# Patient Record
Sex: FEMALE | Race: WHITE | NOT HISPANIC OR LATINO | Employment: FULL TIME | URBAN - NONMETROPOLITAN AREA
[De-identification: names, ages, dates, MRNs, and addresses within clinical notes are randomized per-mention and may not be internally consistent; named-entity substitution may affect disease eponyms.]

---

## 2021-07-12 ENCOUNTER — OFFICE VISIT (OUTPATIENT)
Dept: OBSTETRICS AND GYNECOLOGY | Facility: CLINIC | Age: 31
End: 2021-07-12

## 2021-07-12 VITALS
WEIGHT: 138 LBS | SYSTOLIC BLOOD PRESSURE: 128 MMHG | BODY MASS INDEX: 27.09 KG/M2 | HEIGHT: 60 IN | DIASTOLIC BLOOD PRESSURE: 86 MMHG

## 2021-07-12 DIAGNOSIS — N93.8 DUB (DYSFUNCTIONAL UTERINE BLEEDING): ICD-10-CM

## 2021-07-12 DIAGNOSIS — F17.200 SMOKER: ICD-10-CM

## 2021-07-12 DIAGNOSIS — R10.2 PELVIC PAIN: Primary | ICD-10-CM

## 2021-07-12 DIAGNOSIS — N94.10 DYSPAREUNIA, FEMALE: ICD-10-CM

## 2021-07-12 PROCEDURE — 99203 OFFICE O/P NEW LOW 30 MIN: CPT | Performed by: OBSTETRICS & GYNECOLOGY

## 2021-07-12 RX ORDER — METAXALONE 800 MG/1
800 TABLET ORAL 3 TIMES DAILY
Qty: 30 TABLET | Refills: 3 | Status: SHIPPED | OUTPATIENT
Start: 2021-07-12

## 2021-07-12 RX ORDER — CELECOXIB 100 MG/1
100 CAPSULE ORAL DAILY
Qty: 30 CAPSULE | Refills: 2 | Status: SHIPPED | OUTPATIENT
Start: 2021-07-12 | End: 2022-07-12

## 2021-07-12 RX ORDER — BUSPIRONE HYDROCHLORIDE 15 MG/1
15 TABLET ORAL 3 TIMES DAILY
COMMUNITY

## 2021-07-12 RX ORDER — VENLAFAXINE HYDROCHLORIDE 150 MG/1
150 CAPSULE, EXTENDED RELEASE ORAL DAILY
COMMUNITY

## 2021-07-12 RX ORDER — NORGESTIMATE AND ETHINYL ESTRADIOL 7DAYSX3 LO
1 KIT ORAL DAILY
COMMUNITY

## 2023-11-01 ENCOUNTER — TELEPHONE (OUTPATIENT)
Dept: OBSTETRICS AND GYNECOLOGY | Facility: CLINIC | Age: 33
End: 2023-11-01
Payer: COMMERCIAL

## 2023-11-02 ENCOUNTER — OFFICE VISIT (OUTPATIENT)
Dept: OBSTETRICS AND GYNECOLOGY | Facility: CLINIC | Age: 33
End: 2023-11-02
Payer: COMMERCIAL

## 2023-11-02 VITALS
DIASTOLIC BLOOD PRESSURE: 82 MMHG | HEIGHT: 60 IN | SYSTOLIC BLOOD PRESSURE: 118 MMHG | WEIGHT: 152 LBS | BODY MASS INDEX: 29.84 KG/M2

## 2023-11-02 DIAGNOSIS — N91.2 AMENORRHEA: Primary | ICD-10-CM

## 2023-11-02 PROBLEM — K21.9 GERD (GASTROESOPHAGEAL REFLUX DISEASE): Status: ACTIVE | Noted: 2023-11-02

## 2023-11-09 ENCOUNTER — OFFICE VISIT (OUTPATIENT)
Dept: OBSTETRICS AND GYNECOLOGY | Facility: CLINIC | Age: 33
End: 2023-11-09
Payer: COMMERCIAL

## 2023-11-09 VITALS
DIASTOLIC BLOOD PRESSURE: 80 MMHG | HEIGHT: 60 IN | WEIGHT: 152 LBS | BODY MASS INDEX: 29.84 KG/M2 | SYSTOLIC BLOOD PRESSURE: 142 MMHG

## 2023-11-09 DIAGNOSIS — O03.9 SAB (SPONTANEOUS ABORTION): Primary | ICD-10-CM

## 2023-11-09 DIAGNOSIS — O02.1 MISSED ABORTION: ICD-10-CM

## 2023-11-10 ENCOUNTER — TELEPHONE (OUTPATIENT)
Dept: OBSTETRICS AND GYNECOLOGY | Facility: CLINIC | Age: 33
End: 2023-11-10
Payer: COMMERCIAL

## 2023-11-10 LAB — HCG INTACT+B SERPL-ACNC: NORMAL MIU/ML

## 2023-11-20 ENCOUNTER — PRE-ADMISSION TESTING (OUTPATIENT)
Dept: PREADMISSION TESTING | Facility: HOSPITAL | Age: 33
End: 2023-11-20
Payer: COMMERCIAL

## 2023-11-20 ENCOUNTER — OFFICE VISIT (OUTPATIENT)
Dept: OBSTETRICS AND GYNECOLOGY | Facility: CLINIC | Age: 33
End: 2023-11-20
Payer: COMMERCIAL

## 2023-11-20 VITALS
BODY MASS INDEX: 28.11 KG/M2 | HEIGHT: 62 IN | DIASTOLIC BLOOD PRESSURE: 69 MMHG | HEART RATE: 74 BPM | SYSTOLIC BLOOD PRESSURE: 130 MMHG | RESPIRATION RATE: 16 BRPM | WEIGHT: 152.78 LBS | OXYGEN SATURATION: 98 %

## 2023-11-20 VITALS
BODY MASS INDEX: 29.84 KG/M2 | DIASTOLIC BLOOD PRESSURE: 80 MMHG | HEIGHT: 60 IN | WEIGHT: 152 LBS | SYSTOLIC BLOOD PRESSURE: 122 MMHG

## 2023-11-20 DIAGNOSIS — O02.1 MISSED ABORTION: Primary | ICD-10-CM

## 2023-11-20 DIAGNOSIS — O02.1 MISSED ABORTION: ICD-10-CM

## 2023-11-20 LAB
ABO GROUP BLD: NORMAL
ANION GAP SERPL CALCULATED.3IONS-SCNC: 9 MMOL/L (ref 5–15)
BASOPHILS # BLD AUTO: 0.05 10*3/MM3 (ref 0–0.2)
BASOPHILS NFR BLD AUTO: 0.6 % (ref 0–1.5)
BLD GP AB SCN SERPL QL: NEGATIVE
BUN SERPL-MCNC: 6 MG/DL (ref 6–20)
BUN/CREAT SERPL: 13.3 (ref 7–25)
CALCIUM SPEC-SCNC: 8.6 MG/DL (ref 8.6–10.5)
CHLORIDE SERPL-SCNC: 106 MMOL/L (ref 98–107)
CO2 SERPL-SCNC: 25 MMOL/L (ref 22–29)
CREAT SERPL-MCNC: 0.45 MG/DL (ref 0.57–1)
DEPRECATED RDW RBC AUTO: 43.1 FL (ref 37–54)
EGFRCR SERPLBLD CKD-EPI 2021: 130.5 ML/MIN/1.73
EOSINOPHIL # BLD AUTO: 0.18 10*3/MM3 (ref 0–0.4)
EOSINOPHIL NFR BLD AUTO: 2.3 % (ref 0.3–6.2)
ERYTHROCYTE [DISTWIDTH] IN BLOOD BY AUTOMATED COUNT: 12.5 % (ref 12.3–15.4)
GLUCOSE SERPL-MCNC: 85 MG/DL (ref 65–99)
HCT VFR BLD AUTO: 41.4 % (ref 34–46.6)
HGB BLD-MCNC: 14 G/DL (ref 12–15.9)
IMM GRANULOCYTES # BLD AUTO: 0.03 10*3/MM3 (ref 0–0.05)
IMM GRANULOCYTES NFR BLD AUTO: 0.4 % (ref 0–0.5)
LYMPHOCYTES # BLD AUTO: 3.11 10*3/MM3 (ref 0.7–3.1)
LYMPHOCYTES NFR BLD AUTO: 38.9 % (ref 19.6–45.3)
MCH RBC QN AUTO: 31.9 PG (ref 26.6–33)
MCHC RBC AUTO-ENTMCNC: 33.8 G/DL (ref 31.5–35.7)
MCV RBC AUTO: 94.3 FL (ref 79–97)
MONOCYTES # BLD AUTO: 0.58 10*3/MM3 (ref 0.1–0.9)
MONOCYTES NFR BLD AUTO: 7.3 % (ref 5–12)
NEUTROPHILS NFR BLD AUTO: 4.05 10*3/MM3 (ref 1.7–7)
NEUTROPHILS NFR BLD AUTO: 50.5 % (ref 42.7–76)
NRBC BLD AUTO-RTO: 0 /100 WBC (ref 0–0.2)
PLATELET # BLD AUTO: 283 10*3/MM3 (ref 140–450)
PMV BLD AUTO: 9.9 FL (ref 6–12)
POTASSIUM SERPL-SCNC: 3.9 MMOL/L (ref 3.5–5.2)
RBC # BLD AUTO: 4.39 10*6/MM3 (ref 3.77–5.28)
RH BLD: NEGATIVE
SODIUM SERPL-SCNC: 140 MMOL/L (ref 136–145)
T&S EXPIRATION DATE: NORMAL
WBC NRBC COR # BLD AUTO: 8 10*3/MM3 (ref 3.4–10.8)

## 2023-11-20 PROCEDURE — 36415 COLL VENOUS BLD VENIPUNCTURE: CPT

## 2023-11-20 PROCEDURE — 86850 RBC ANTIBODY SCREEN: CPT | Performed by: OBSTETRICS & GYNECOLOGY

## 2023-11-20 PROCEDURE — 86900 BLOOD TYPING SEROLOGIC ABO: CPT | Performed by: OBSTETRICS & GYNECOLOGY

## 2023-11-20 PROCEDURE — 80048 BASIC METABOLIC PNL TOTAL CA: CPT

## 2023-11-20 PROCEDURE — 86901 BLOOD TYPING SEROLOGIC RH(D): CPT | Performed by: OBSTETRICS & GYNECOLOGY

## 2023-11-20 PROCEDURE — 85025 COMPLETE CBC W/AUTO DIFF WBC: CPT

## 2023-11-20 RX ORDER — SODIUM CHLORIDE 9 MG/ML
40 INJECTION, SOLUTION INTRAVENOUS AS NEEDED
Status: CANCELLED | OUTPATIENT
Start: 2023-11-20

## 2023-11-20 RX ORDER — SODIUM CHLORIDE 0.9 % (FLUSH) 0.9 %
1-10 SYRINGE (ML) INJECTION AS NEEDED
Status: CANCELLED | OUTPATIENT
Start: 2023-11-20

## 2023-11-20 RX ORDER — ACETAMINOPHEN 500 MG
500 TABLET ORAL AS NEEDED
COMMUNITY

## 2023-11-20 RX ORDER — SODIUM CHLORIDE 9 MG/ML
100 INJECTION, SOLUTION INTRAVENOUS CONTINUOUS
Status: CANCELLED | OUTPATIENT
Start: 2023-11-20

## 2023-11-20 RX ORDER — SODIUM CHLORIDE 0.9 % (FLUSH) 0.9 %
10 SYRINGE (ML) INJECTION EVERY 12 HOURS SCHEDULED
Status: CANCELLED | OUTPATIENT
Start: 2023-11-20

## 2023-11-22 ENCOUNTER — ANESTHESIA (OUTPATIENT)
Dept: PERIOP | Facility: HOSPITAL | Age: 33
End: 2023-11-22
Payer: COMMERCIAL

## 2023-11-22 ENCOUNTER — ANESTHESIA EVENT (OUTPATIENT)
Dept: PERIOP | Facility: HOSPITAL | Age: 33
End: 2023-11-22
Payer: COMMERCIAL

## 2023-11-22 ENCOUNTER — HOSPITAL ENCOUNTER (OUTPATIENT)
Facility: HOSPITAL | Age: 33
Setting detail: HOSPITAL OUTPATIENT SURGERY
Discharge: HOME | End: 2023-11-22
Attending: OBSTETRICS & GYNECOLOGY | Admitting: OBSTETRICS & GYNECOLOGY
Payer: COMMERCIAL

## 2023-11-22 VITALS
HEART RATE: 63 BPM | TEMPERATURE: 97.4 F | SYSTOLIC BLOOD PRESSURE: 106 MMHG | OXYGEN SATURATION: 99 % | DIASTOLIC BLOOD PRESSURE: 71 MMHG | RESPIRATION RATE: 16 BRPM

## 2023-11-22 DIAGNOSIS — G89.18 POST-OP PAIN: Primary | ICD-10-CM

## 2023-11-22 DIAGNOSIS — O02.1 MISSED ABORTION: ICD-10-CM

## 2023-11-22 LAB — NUMBER OF DOSES: NORMAL

## 2023-11-22 PROCEDURE — 25810000003 LACTATED RINGERS PER 1000 ML: Performed by: OBSTETRICS & GYNECOLOGY

## 2023-11-22 PROCEDURE — 25010000002 ONDANSETRON PER 1 MG

## 2023-11-22 PROCEDURE — 25010000002 OXYTOCIN PER 10 UNITS

## 2023-11-22 PROCEDURE — 25010000002 RHO D IMMUNE GLOBULIN 1500 UNITS SOLUTION PREFILLED SYRINGE: Performed by: OBSTETRICS & GYNECOLOGY

## 2023-11-22 PROCEDURE — 25010000002 LEVOFLOXACIN PER 250 MG: Performed by: OBSTETRICS & GYNECOLOGY

## 2023-11-22 PROCEDURE — 25010000002 FENTANYL CITRATE (PF) 100 MCG/2ML SOLUTION

## 2023-11-22 PROCEDURE — 25010000002 PROPOFOL 10 MG/ML EMULSION

## 2023-11-22 PROCEDURE — 25010000002 DEXAMETHASONE PER 1 MG

## 2023-11-22 PROCEDURE — 25010000002 MIDAZOLAM PER 1 MG: Performed by: ANESTHESIOLOGY

## 2023-11-22 PROCEDURE — 88305 TISSUE EXAM BY PATHOLOGIST: CPT | Performed by: OBSTETRICS & GYNECOLOGY

## 2023-11-22 PROCEDURE — 59820 CARE OF MISCARRIAGE: CPT | Performed by: OBSTETRICS & GYNECOLOGY

## 2023-11-22 RX ORDER — HYDROCODONE BITARTRATE AND ACETAMINOPHEN 10; 325 MG/1; MG/1
1 TABLET ORAL EVERY 4 HOURS PRN
Status: DISCONTINUED | OUTPATIENT
Start: 2023-11-22 | End: 2023-11-22 | Stop reason: HOSPADM

## 2023-11-22 RX ORDER — SODIUM CHLORIDE, SODIUM LACTATE, POTASSIUM CHLORIDE, CALCIUM CHLORIDE 600; 310; 30; 20 MG/100ML; MG/100ML; MG/100ML; MG/100ML
100 INJECTION, SOLUTION INTRAVENOUS CONTINUOUS
Status: DISCONTINUED | OUTPATIENT
Start: 2023-11-22 | End: 2023-11-22 | Stop reason: HOSPADM

## 2023-11-22 RX ORDER — DROPERIDOL 2.5 MG/ML
0.62 INJECTION, SOLUTION INTRAMUSCULAR; INTRAVENOUS ONCE AS NEEDED
Status: DISCONTINUED | OUTPATIENT
Start: 2023-11-22 | End: 2023-11-22 | Stop reason: HOSPADM

## 2023-11-22 RX ORDER — SODIUM CHLORIDE 0.9 % (FLUSH) 0.9 %
3-10 SYRINGE (ML) INJECTION AS NEEDED
Status: DISCONTINUED | OUTPATIENT
Start: 2023-11-22 | End: 2023-11-22 | Stop reason: HOSPADM

## 2023-11-22 RX ORDER — OXYCODONE HYDROCHLORIDE AND ACETAMINOPHEN 5; 325 MG/1; MG/1
1 TABLET ORAL EVERY 8 HOURS PRN
Qty: 10 TABLET | Refills: 0 | Status: SHIPPED | OUTPATIENT
Start: 2023-11-22

## 2023-11-22 RX ORDER — ONDANSETRON 2 MG/ML
INJECTION INTRAMUSCULAR; INTRAVENOUS AS NEEDED
Status: DISCONTINUED | OUTPATIENT
Start: 2023-11-22 | End: 2023-11-22 | Stop reason: SURG

## 2023-11-22 RX ORDER — SUCCINYLCHOLINE/SOD CL,ISO/PF 200MG/10ML
SYRINGE (ML) INTRAVENOUS AS NEEDED
Status: DISCONTINUED | OUTPATIENT
Start: 2023-11-22 | End: 2023-11-22 | Stop reason: SURG

## 2023-11-22 RX ORDER — LEVOFLOXACIN 5 MG/ML
500 INJECTION, SOLUTION INTRAVENOUS ONCE
Status: COMPLETED | OUTPATIENT
Start: 2023-11-22 | End: 2023-11-22

## 2023-11-22 RX ORDER — SODIUM CHLORIDE 9 MG/ML
40 INJECTION, SOLUTION INTRAVENOUS AS NEEDED
Status: DISCONTINUED | OUTPATIENT
Start: 2023-11-22 | End: 2023-11-22 | Stop reason: HOSPADM

## 2023-11-22 RX ORDER — MIDAZOLAM HYDROCHLORIDE 1 MG/ML
1 INJECTION INTRAMUSCULAR; INTRAVENOUS
Status: DISCONTINUED | OUTPATIENT
Start: 2023-11-22 | End: 2023-11-22 | Stop reason: HOSPADM

## 2023-11-22 RX ORDER — LIDOCAINE HYDROCHLORIDE 20 MG/ML
INJECTION, SOLUTION EPIDURAL; INFILTRATION; INTRACAUDAL; PERINEURAL AS NEEDED
Status: DISCONTINUED | OUTPATIENT
Start: 2023-11-22 | End: 2023-11-22 | Stop reason: SURG

## 2023-11-22 RX ORDER — ROCURONIUM BROMIDE 10 MG/ML
INJECTION, SOLUTION INTRAVENOUS AS NEEDED
Status: DISCONTINUED | OUTPATIENT
Start: 2023-11-22 | End: 2023-11-22 | Stop reason: SURG

## 2023-11-22 RX ORDER — FLUMAZENIL 0.1 MG/ML
0.2 INJECTION INTRAVENOUS AS NEEDED
Status: DISCONTINUED | OUTPATIENT
Start: 2023-11-22 | End: 2023-11-22 | Stop reason: HOSPADM

## 2023-11-22 RX ORDER — ACETAMINOPHEN 500 MG
1000 TABLET ORAL ONCE
Status: COMPLETED | OUTPATIENT
Start: 2023-11-22 | End: 2023-11-22

## 2023-11-22 RX ORDER — FENTANYL CITRATE 50 UG/ML
INJECTION, SOLUTION INTRAMUSCULAR; INTRAVENOUS AS NEEDED
Status: DISCONTINUED | OUTPATIENT
Start: 2023-11-22 | End: 2023-11-22 | Stop reason: SURG

## 2023-11-22 RX ORDER — SODIUM CHLORIDE 9 MG/ML
100 INJECTION, SOLUTION INTRAVENOUS CONTINUOUS
Status: DISCONTINUED | OUTPATIENT
Start: 2023-11-22 | End: 2023-11-22 | Stop reason: HOSPADM

## 2023-11-22 RX ORDER — NALOXONE HCL 0.4 MG/ML
0.4 VIAL (ML) INJECTION AS NEEDED
Status: DISCONTINUED | OUTPATIENT
Start: 2023-11-22 | End: 2023-11-22 | Stop reason: HOSPADM

## 2023-11-22 RX ORDER — IBUPROFEN 600 MG/1
600 TABLET ORAL ONCE AS NEEDED
Status: COMPLETED | OUTPATIENT
Start: 2023-11-22 | End: 2023-11-22

## 2023-11-22 RX ORDER — LABETALOL HYDROCHLORIDE 5 MG/ML
5 INJECTION, SOLUTION INTRAVENOUS
Status: DISCONTINUED | OUTPATIENT
Start: 2023-11-22 | End: 2023-11-22 | Stop reason: HOSPADM

## 2023-11-22 RX ORDER — OXYTOCIN 10 [USP'U]/ML
INJECTION, SOLUTION INTRAMUSCULAR; INTRAVENOUS AS NEEDED
Status: DISCONTINUED | OUTPATIENT
Start: 2023-11-22 | End: 2023-11-22 | Stop reason: SURG

## 2023-11-22 RX ORDER — DEXAMETHASONE SODIUM PHOSPHATE 4 MG/ML
INJECTION, SOLUTION INTRA-ARTICULAR; INTRALESIONAL; INTRAMUSCULAR; INTRAVENOUS; SOFT TISSUE AS NEEDED
Status: DISCONTINUED | OUTPATIENT
Start: 2023-11-22 | End: 2023-11-22 | Stop reason: SURG

## 2023-11-22 RX ORDER — SODIUM CHLORIDE, SODIUM LACTATE, POTASSIUM CHLORIDE, CALCIUM CHLORIDE 600; 310; 30; 20 MG/100ML; MG/100ML; MG/100ML; MG/100ML
1000 INJECTION, SOLUTION INTRAVENOUS CONTINUOUS
Status: DISCONTINUED | OUTPATIENT
Start: 2023-11-22 | End: 2023-11-22 | Stop reason: HOSPADM

## 2023-11-22 RX ORDER — SODIUM CHLORIDE 0.9 % (FLUSH) 0.9 %
1-10 SYRINGE (ML) INJECTION AS NEEDED
Status: DISCONTINUED | OUTPATIENT
Start: 2023-11-22 | End: 2023-11-22 | Stop reason: HOSPADM

## 2023-11-22 RX ORDER — SODIUM CHLORIDE 0.9 % (FLUSH) 0.9 %
10 SYRINGE (ML) INJECTION EVERY 12 HOURS SCHEDULED
Status: DISCONTINUED | OUTPATIENT
Start: 2023-11-22 | End: 2023-11-22 | Stop reason: HOSPADM

## 2023-11-22 RX ORDER — PROPOFOL 10 MG/ML
VIAL (ML) INTRAVENOUS AS NEEDED
Status: DISCONTINUED | OUTPATIENT
Start: 2023-11-22 | End: 2023-11-22 | Stop reason: SURG

## 2023-11-22 RX ORDER — FENTANYL CITRATE 50 UG/ML
25 INJECTION, SOLUTION INTRAMUSCULAR; INTRAVENOUS
Status: DISCONTINUED | OUTPATIENT
Start: 2023-11-22 | End: 2023-11-22 | Stop reason: HOSPADM

## 2023-11-22 RX ORDER — SODIUM CHLORIDE 0.9 % (FLUSH) 0.9 %
3 SYRINGE (ML) INJECTION EVERY 12 HOURS SCHEDULED
Status: DISCONTINUED | OUTPATIENT
Start: 2023-11-22 | End: 2023-11-22 | Stop reason: HOSPADM

## 2023-11-22 RX ORDER — ONDANSETRON 2 MG/ML
4 INJECTION INTRAMUSCULAR; INTRAVENOUS ONCE AS NEEDED
Status: DISCONTINUED | OUTPATIENT
Start: 2023-11-22 | End: 2023-11-22 | Stop reason: HOSPADM

## 2023-11-22 RX ORDER — HYDROCODONE BITARTRATE AND ACETAMINOPHEN 5; 325 MG/1; MG/1
1 TABLET ORAL ONCE AS NEEDED
Status: DISCONTINUED | OUTPATIENT
Start: 2023-11-22 | End: 2023-11-22 | Stop reason: HOSPADM

## 2023-11-22 RX ADMIN — OXYTOCIN 20 UNITS: 10 INJECTION, SOLUTION INTRAMUSCULAR; INTRAVENOUS at 07:16

## 2023-11-22 RX ADMIN — Medication 120 MG: at 07:03

## 2023-11-22 RX ADMIN — SODIUM CHLORIDE, POTASSIUM CHLORIDE, SODIUM LACTATE AND CALCIUM CHLORIDE 1000 ML: 600; 310; 30; 20 INJECTION, SOLUTION INTRAVENOUS at 06:15

## 2023-11-22 RX ADMIN — MIDAZOLAM HYDROCHLORIDE 1 MG: 1 INJECTION, SOLUTION INTRAMUSCULAR; INTRAVENOUS at 06:42

## 2023-11-22 RX ADMIN — DEXAMETHASONE SODIUM PHOSPHATE 4 MG: 4 INJECTION, SOLUTION INTRAMUSCULAR; INTRAVENOUS at 07:08

## 2023-11-22 RX ADMIN — FENTANYL CITRATE 100 MCG: 50 INJECTION, SOLUTION INTRAMUSCULAR; INTRAVENOUS at 07:00

## 2023-11-22 RX ADMIN — LEVOFLOXACIN 500 MG: 500 INJECTION, SOLUTION INTRAVENOUS at 06:15

## 2023-11-22 RX ADMIN — ONDANSETRON 4 MG: 2 INJECTION INTRAMUSCULAR; INTRAVENOUS at 07:12

## 2023-11-22 RX ADMIN — ROCURONIUM BROMIDE 5 MG: 10 INJECTION, SOLUTION INTRAVENOUS at 07:00

## 2023-11-22 RX ADMIN — IBUPROFEN 600 MG: 600 TABLET, FILM COATED ORAL at 08:34

## 2023-11-22 RX ADMIN — LIDOCAINE HYDROCHLORIDE 80 MG: 20 INJECTION, SOLUTION EPIDURAL; INFILTRATION; INTRACAUDAL; PERINEURAL at 07:00

## 2023-11-22 RX ADMIN — ACETAMINOPHEN 1000 MG: 500 TABLET, FILM COATED ORAL at 06:42

## 2023-11-22 RX ADMIN — PROPOFOL INJECTABLE EMULSION 200 MG: 10 INJECTION, EMULSION INTRAVENOUS at 07:02

## 2023-11-22 RX ADMIN — HUMAN RHO(D) IMMUNE GLOBULIN 1500 UNITS: 300 INJECTION, SOLUTION INTRAMUSCULAR at 08:35

## 2023-11-24 LAB
CYTO UR: NORMAL
LAB AP CASE REPORT: NORMAL
Lab: NORMAL
PATH REPORT.FINAL DX SPEC: NORMAL
PATH REPORT.GROSS SPEC: NORMAL

## 2023-12-06 ENCOUNTER — OFFICE VISIT (OUTPATIENT)
Dept: OBSTETRICS AND GYNECOLOGY | Facility: CLINIC | Age: 33
End: 2023-12-06
Payer: COMMERCIAL

## 2023-12-06 VITALS
DIASTOLIC BLOOD PRESSURE: 82 MMHG | SYSTOLIC BLOOD PRESSURE: 128 MMHG | HEIGHT: 60 IN | BODY MASS INDEX: 29.84 KG/M2 | WEIGHT: 152 LBS

## 2023-12-06 DIAGNOSIS — Z09 EXAMINATION FOLLOWING SURGERY: Primary | ICD-10-CM

## 2023-12-06 PROCEDURE — 99024 POSTOP FOLLOW-UP VISIT: CPT | Performed by: OBSTETRICS & GYNECOLOGY

## 2024-03-27 ENCOUNTER — TELEPHONE (OUTPATIENT)
Dept: OBSTETRICS AND GYNECOLOGY | Age: 34
End: 2024-03-27
Payer: COMMERCIAL

## 2024-03-29 ENCOUNTER — OFFICE VISIT (OUTPATIENT)
Age: 34
End: 2024-03-29
Payer: COMMERCIAL

## 2024-03-29 VITALS
BODY MASS INDEX: 29.64 KG/M2 | DIASTOLIC BLOOD PRESSURE: 74 MMHG | HEIGHT: 60 IN | SYSTOLIC BLOOD PRESSURE: 130 MMHG | WEIGHT: 151 LBS

## 2024-03-29 DIAGNOSIS — Z67.41 TYPE O BLOOD, RH NEGATIVE: ICD-10-CM

## 2024-03-29 DIAGNOSIS — N83.202 LEFT OVARIAN CYST: ICD-10-CM

## 2024-03-29 DIAGNOSIS — O03.9 SPONTANEOUS MISCARRIAGE: Primary | ICD-10-CM

## 2024-03-30 LAB
ABO GROUP BLD: NORMAL
BLD GP AB SCN SERPL QL: NEGATIVE
HCG INTACT+B SERPL-ACNC: 7 MIU/ML
RH BLD: NEGATIVE

## 2024-04-03 DIAGNOSIS — O03.9 SPONTANEOUS MISCARRIAGE: Primary | ICD-10-CM

## 2024-04-04 ENCOUNTER — TELEPHONE (OUTPATIENT)
Dept: OBSTETRICS AND GYNECOLOGY | Age: 34
End: 2024-04-04

## 2024-04-04 LAB — HCG INTACT+B SERPL-ACNC: 3 MIU/ML

## 2024-05-10 ENCOUNTER — TELEPHONE (OUTPATIENT)
Age: 34
End: 2024-05-10

## 2024-05-10 ENCOUNTER — OFFICE VISIT (OUTPATIENT)
Age: 34
End: 2024-05-10
Payer: COMMERCIAL

## 2024-05-10 VITALS
SYSTOLIC BLOOD PRESSURE: 130 MMHG | DIASTOLIC BLOOD PRESSURE: 84 MMHG | WEIGHT: 152 LBS | HEIGHT: 60 IN | BODY MASS INDEX: 29.84 KG/M2

## 2024-05-10 DIAGNOSIS — Z31.69 ENCOUNTER FOR PRECONCEPTION CONSULTATION: ICD-10-CM

## 2024-05-10 DIAGNOSIS — N83.202 LEFT OVARIAN CYST: Primary | ICD-10-CM

## 2024-05-10 DIAGNOSIS — Z51.89 FOLLOW-UP VISIT AFTER MISCARRIAGE: ICD-10-CM

## 2024-05-10 DIAGNOSIS — O03.9 FOLLOW-UP VISIT AFTER MISCARRIAGE: ICD-10-CM

## 2024-05-22 DIAGNOSIS — N96 HISTORY OF RECURRENT MISCARRIAGES: Primary | ICD-10-CM

## 2024-05-22 DIAGNOSIS — Z31.69 ENCOUNTER FOR PRECONCEPTION CONSULTATION: ICD-10-CM

## 2024-06-03 LAB
ANA SER QL: NEGATIVE
APTT HEX PL PPP: 1 SEC
APTT IMM NP PPP: NORMAL SEC
APTT PPP 1:1 SALINE: NORMAL SEC
APTT PPP: 26.2 SEC
B2 GLYCOPROT1 IGA SER-ACNC: <10 SAU
B2 GLYCOPROT1 IGG SER-ACNC: <10 SGU
B2 GLYCOPROT1 IGM SER-ACNC: <10 SMU
CARDIOLIPIN IGG SER IA-ACNC: <10 GPL
CARDIOLIPIN IGG SER IA-ACNC: <9 GPL U/ML (ref 0–14)
CARDIOLIPIN IGM SER IA-ACNC: <10 MPL
CARDIOLIPIN IGM SER IA-ACNC: <9 MPL U/ML (ref 0–12)
CONFIRM APTT: 1.1 SEC
CONFIRM DRVVT: NORMAL SEC
DRVVT SCREEN TO CONFIRM RATIO: NORMAL RATIO
ERYTHROCYTE [DISTWIDTH] IN BLOOD BY AUTOMATED COUNT: 12.1 % (ref 11.7–15.4)
HCT VFR BLD AUTO: 45.3 % (ref 34–46.6)
HGB BLD-MCNC: 15.4 G/DL (ref 11.1–15.9)
INR PPP: 1.1 RATIO
LABORATORY COMMENT REPORT: NORMAL
MCH RBC QN AUTO: 32.5 PG (ref 26.6–33)
MCHC RBC AUTO-ENTMCNC: 34 G/DL (ref 31.5–35.7)
MCV RBC AUTO: 96 FL (ref 79–97)
PLATELET # BLD AUTO: 258 X10E3/UL (ref 150–450)
PROTHROMBIN TIME: 11.1 SEC
RBC # BLD AUTO: 4.74 X10E6/UL (ref 3.77–5.28)
SCREEN DRVVT: 27.2 SEC
THROMBIN TIME: 18.2 SEC
TSH SERPL DL<=0.005 MIU/L-ACNC: 1.25 UIU/ML (ref 0.45–4.5)
WBC # BLD AUTO: 7.1 X10E3/UL (ref 3.4–10.8)

## 2024-08-14 ENCOUNTER — INITIAL PRENATAL (OUTPATIENT)
Age: 34
End: 2024-08-14
Payer: COMMERCIAL

## 2024-08-14 VITALS — DIASTOLIC BLOOD PRESSURE: 72 MMHG | WEIGHT: 157 LBS | BODY MASS INDEX: 30.66 KG/M2 | SYSTOLIC BLOOD PRESSURE: 112 MMHG

## 2024-08-14 DIAGNOSIS — O99.340 ANXIETY DURING PREGNANCY, ANTEPARTUM: ICD-10-CM

## 2024-08-14 DIAGNOSIS — Z3A.01 6 WEEKS GESTATION OF PREGNANCY: ICD-10-CM

## 2024-08-14 DIAGNOSIS — O36.80X0 ENCOUNTER TO DETERMINE FETAL VIABILITY OF PREGNANCY, SINGLE OR UNSPECIFIED FETUS: Primary | ICD-10-CM

## 2024-08-14 DIAGNOSIS — O99.320 MARIJUANA USE DURING PREGNANCY: ICD-10-CM

## 2024-08-14 DIAGNOSIS — F12.90 MARIJUANA USE DURING PREGNANCY: ICD-10-CM

## 2024-08-14 DIAGNOSIS — O21.0 MORNING SICKNESS: ICD-10-CM

## 2024-08-14 DIAGNOSIS — O99.330 TOBACCO USE DURING PREGNANCY, ANTEPARTUM: ICD-10-CM

## 2024-08-14 DIAGNOSIS — F41.9 ANXIETY DURING PREGNANCY, ANTEPARTUM: ICD-10-CM

## 2024-08-14 DIAGNOSIS — O09.521 MULTIGRAVIDA OF ADVANCED MATERNAL AGE IN FIRST TRIMESTER: ICD-10-CM

## 2024-08-14 DIAGNOSIS — N96 HISTORY OF RECURRENT MISCARRIAGES: ICD-10-CM

## 2024-08-14 PROBLEM — O09.299 PREGNANCY WITH HISTORY OF MISCARRIAGE: Status: ACTIVE | Noted: 2024-08-14

## 2024-08-14 PROBLEM — O09.529 AMA (ADVANCED MATERNAL AGE) MULTIGRAVIDA 35+: Status: ACTIVE | Noted: 2024-08-14

## 2024-08-14 PROBLEM — Z34.90 PREGNANCY: Status: ACTIVE | Noted: 2024-08-14

## 2024-08-14 PROCEDURE — 0501F PRENATAL FLOW SHEET: CPT | Performed by: ADVANCED PRACTICE MIDWIFE

## 2024-08-23 ENCOUNTER — ROUTINE PRENATAL (OUTPATIENT)
Age: 34
End: 2024-08-23
Payer: COMMERCIAL

## 2024-08-23 VITALS — BODY MASS INDEX: 30.86 KG/M2 | SYSTOLIC BLOOD PRESSURE: 140 MMHG | WEIGHT: 158 LBS | DIASTOLIC BLOOD PRESSURE: 78 MMHG

## 2024-08-23 DIAGNOSIS — Z36.2 ULTRASOUND SCAN TO RECHECK FETAL HEART RATE: ICD-10-CM

## 2024-08-23 DIAGNOSIS — Z3A.01 LESS THAN 8 WEEKS GESTATION OF PREGNANCY: Primary | ICD-10-CM

## 2024-08-23 DIAGNOSIS — Z36.3 SCREENING, ANTENATAL, FOR MALFORMATION BY ULTRASOUND: ICD-10-CM

## 2024-08-23 DIAGNOSIS — O21.9 NAUSEA AND VOMITING DURING PREGNANCY: ICD-10-CM

## 2024-08-23 DIAGNOSIS — Z34.81 MULTIGRAVIDA IN FIRST TRIMESTER: ICD-10-CM

## 2024-08-23 LAB
GLUCOSE UR STRIP-MCNC: NEGATIVE MG/DL
PROT UR STRIP-MCNC: NEGATIVE MG/DL

## 2024-08-23 RX ORDER — MECLIZINE HYDROCHLORIDE 25 MG/1
25 TABLET ORAL 3 TIMES DAILY PRN
Qty: 30 TABLET | Refills: 1 | Status: SHIPPED | OUTPATIENT
Start: 2024-08-23

## 2024-08-23 RX ORDER — METOCLOPRAMIDE 10 MG/1
10 TABLET ORAL 4 TIMES DAILY PRN
Qty: 120 TABLET | Refills: 1 | Status: SHIPPED | OUTPATIENT
Start: 2024-08-23

## 2024-08-26 LAB
ABO GROUP BLD: NORMAL
BACTERIA UR CULT: NORMAL
BACTERIA UR CULT: NORMAL
BASOPHILS # BLD AUTO: 0.1 X10E3/UL (ref 0–0.2)
BASOPHILS NFR BLD AUTO: 1 %
BLD GP AB SCN SERPL QL: NEGATIVE
C TRACH RRNA SPEC QL NAA+PROBE: NEGATIVE
EOSINOPHIL # BLD AUTO: 0.2 X10E3/UL (ref 0–0.4)
EOSINOPHIL NFR BLD AUTO: 1 %
ERYTHROCYTE [DISTWIDTH] IN BLOOD BY AUTOMATED COUNT: 12 % (ref 11.7–15.4)
HBA1C MFR BLD: 5.5 % (ref 4.8–5.6)
HBV SURFACE AG SERPL QL IA: NEGATIVE
HCT VFR BLD AUTO: 42.4 % (ref 34–46.6)
HCV AB SERPL QL IA: NORMAL
HCV IGG SERPL QL IA: NON REACTIVE
HGB BLD-MCNC: 14.3 G/DL (ref 11.1–15.9)
HIV 1+2 AB+HIV1 P24 AG SERPL QL IA: NON REACTIVE
IMM GRANULOCYTES # BLD AUTO: 0 X10E3/UL (ref 0–0.1)
IMM GRANULOCYTES NFR BLD AUTO: 0 %
LYMPHOCYTES # BLD AUTO: 3.3 X10E3/UL (ref 0.7–3.1)
LYMPHOCYTES NFR BLD AUTO: 29 %
MCH RBC QN AUTO: 32.6 PG (ref 26.6–33)
MCHC RBC AUTO-ENTMCNC: 33.7 G/DL (ref 31.5–35.7)
MCV RBC AUTO: 97 FL (ref 79–97)
MONOCYTES # BLD AUTO: 0.8 X10E3/UL (ref 0.1–0.9)
MONOCYTES NFR BLD AUTO: 7 %
N GONORRHOEA RRNA SPEC QL NAA+PROBE: NEGATIVE
NEUTROPHILS # BLD AUTO: 7 X10E3/UL (ref 1.4–7)
NEUTROPHILS NFR BLD AUTO: 62 %
PLATELET # BLD AUTO: 284 X10E3/UL (ref 150–450)
RBC # BLD AUTO: 4.38 X10E6/UL (ref 3.77–5.28)
RH BLD: NEGATIVE
RPR SER QL: NON REACTIVE
RUBV IGG SERPL IA-ACNC: 4.23 INDEX
T VAGINALIS RRNA SPEC QL NAA+PROBE: NEGATIVE
TSH SERPL DL<=0.005 MIU/L-ACNC: 1.09 UIU/ML (ref 0.45–4.5)
VZV IGG SER IA-ACNC: 2035 INDEX
WBC # BLD AUTO: 11.3 X10E3/UL (ref 3.4–10.8)

## 2024-08-30 LAB — DRUGS UR: NORMAL

## 2024-09-03 ENCOUNTER — REFERRAL TRIAGE (OUTPATIENT)
Dept: LABOR AND DELIVERY | Facility: HOSPITAL | Age: 34
End: 2024-09-03
Payer: COMMERCIAL

## 2024-09-09 ENCOUNTER — CLINICAL SUPPORT (OUTPATIENT)
Dept: OBSTETRICS AND GYNECOLOGY | Age: 34
End: 2024-09-09
Payer: COMMERCIAL

## 2024-09-09 DIAGNOSIS — Z13.71 GENETIC DISEASE CARRIER STATUS TESTING, FEMALE: ICD-10-CM

## 2024-09-09 DIAGNOSIS — Z36.0 ENCOUNTER FOR ANTENATAL SCREENING FOR CHROMOSOMAL ANOMALIES: Primary | ICD-10-CM

## 2024-09-09 DIAGNOSIS — Z3A.10 10 WEEKS GESTATION OF PREGNANCY: ICD-10-CM

## 2024-09-20 ENCOUNTER — ROUTINE PRENATAL (OUTPATIENT)
Age: 34
End: 2024-09-20
Payer: COMMERCIAL

## 2024-09-20 VITALS — WEIGHT: 158.7 LBS | BODY MASS INDEX: 30.99 KG/M2 | SYSTOLIC BLOOD PRESSURE: 134 MMHG | DIASTOLIC BLOOD PRESSURE: 90 MMHG

## 2024-09-20 DIAGNOSIS — Z3A.11 11 WEEKS GESTATION OF PREGNANCY: Primary | ICD-10-CM

## 2024-09-20 LAB
GLUCOSE UR STRIP-MCNC: NEGATIVE MG/DL
PROT UR STRIP-MCNC: NEGATIVE MG/DL

## 2024-09-20 RX ORDER — ACETAMINOPHEN 500 MG
1000 TABLET ORAL EVERY 6 HOURS PRN
COMMUNITY

## 2024-09-25 LAB
Lab: NEGATIVE
Lab: NORMAL
NTRA ALPHA-THALASSEMIA: NEGATIVE
NTRA BETA-HEMOGLOBINOPATHIES: NEGATIVE
NTRA CANAVAN DISEASE: NEGATIVE
NTRA CYSTIC FIBROSIS: NEGATIVE
NTRA DUCHENNE/BECKER MUSCULAR DYSTROPHY: NEGATIVE
NTRA FAMILIAL DYSAUTONOMIA: NEGATIVE
NTRA FRAGILE X SYNDROME: NEGATIVE
NTRA GALACTOSEMIA: NEGATIVE
NTRA GAUCHER DISEASE: NEGATIVE
NTRA MEDIUM CHAIN ACYL-COA DEHYDROGENASE DEFICIENCY: NEGATIVE
NTRA POLYCYSTIC KIDNEY DISEASE, AUTOSOMAL RECESSIVE: NEGATIVE
NTRA SMITH-LEMLI-OPITZ SYNDROME: NEGATIVE
NTRA SPINAL MUSCULAR ATROPHY: NEGATIVE
NTRA TAY-SACHS DISEASE: NEGATIVE

## 2024-10-17 ENCOUNTER — PATIENT OUTREACH (OUTPATIENT)
Dept: LABOR AND DELIVERY | Facility: HOSPITAL | Age: 34
End: 2024-10-17
Payer: COMMERCIAL

## 2024-10-18 ENCOUNTER — ROUTINE PRENATAL (OUTPATIENT)
Age: 34
End: 2024-10-18
Payer: COMMERCIAL

## 2024-10-18 VITALS — SYSTOLIC BLOOD PRESSURE: 132 MMHG | BODY MASS INDEX: 31.64 KG/M2 | WEIGHT: 162 LBS | DIASTOLIC BLOOD PRESSURE: 74 MMHG

## 2024-10-18 DIAGNOSIS — Z3A.15 15 WEEKS GESTATION OF PREGNANCY: Primary | ICD-10-CM

## 2024-10-18 DIAGNOSIS — Z71.85 IMMUNIZATION COUNSELING: ICD-10-CM

## 2024-10-18 DIAGNOSIS — Z34.82 MULTIGRAVIDA IN SECOND TRIMESTER: ICD-10-CM

## 2024-10-18 LAB
GLUCOSE UR STRIP-MCNC: NEGATIVE MG/DL
PROT UR STRIP-MCNC: ABNORMAL MG/DL

## 2024-10-18 NOTE — PROGRESS NOTES
Reason for visit: Routine OB visit at 15w5d. NORTH 2025, by Ultrasound    CC: . Denies VB, LOF, pelvic pain, or cramping.     ROS: All systems reviewed and are negative with exception of the following: amenorrhea    Wt 162 lb for a TWG of 2.268 kg (5 lb), /74, FHTs 136  Urine today and reviewed: negative glucose, trace protein    Anatomy Scan: scheduled for 2024    Exam:  General Appearance:  Healthy appearing . Normal mood and behavior.  HEENT: NCAT, EOMI  HR str and reg. Lungs clear. Resp even and unlabored.  Abdomen is soft and nontender. No CVA tenderness. Uterus is consistent with EGA  Ext: no edema, nontender, no trauma, or cyanosis.    Impression  Diagnoses and all orders for this visit:    1. 15 weeks gestation of pregnancy (Primary)  -     POC Urinalysis Dipstick    2. Multigravida in second trimester  Discussed second trimester of pregnancy, discomforts and measures of support, fetal movement, pelvic pain warnings, bleeding warnings, and signs and symptoms to report. Reviewed low risk chromosomal risk and negative maternal carrier screening results. Discussed and encouraged to call or come to the hospital with vaginal bleeding, leaking of fluid, pelvic pain, or other concerns.     3. Immunization counseling  Discussed influenza immunization recommendations during pregnancy. She declines the immunization during this prenatal visit.         Refer to the AVS instructions for additional education provided with today's visit.           Return to the clinic in 4 weeks for routine prenatal visit with Dr. Cole and as needed with concerns.         This note has been signed electronically.    Freida Wallis, DNP, APRN, CNM, RNC-OB

## 2024-10-21 ENCOUNTER — PATIENT OUTREACH (OUTPATIENT)
Dept: LABOR AND DELIVERY | Facility: HOSPITAL | Age: 34
End: 2024-10-21
Payer: COMMERCIAL

## 2024-11-15 ENCOUNTER — ROUTINE PRENATAL (OUTPATIENT)
Age: 34
End: 2024-11-15
Payer: COMMERCIAL

## 2024-11-15 VITALS — BODY MASS INDEX: 31.85 KG/M2 | SYSTOLIC BLOOD PRESSURE: 136 MMHG | DIASTOLIC BLOOD PRESSURE: 90 MMHG | WEIGHT: 163.1 LBS

## 2024-11-15 DIAGNOSIS — O09.522 MULTIGRAVIDA OF ADVANCED MATERNAL AGE IN SECOND TRIMESTER: ICD-10-CM

## 2024-11-15 DIAGNOSIS — Z3A.19 19 WEEKS GESTATION OF PREGNANCY: Primary | ICD-10-CM

## 2024-11-15 NOTE — PROGRESS NOTES
No complaints. Have FM.  Denies VB, LOF, ctx.  Anatomy US was normal.     /90   Wt 74 kg (163 lb 1.6 oz)   LMP 06/24/2024 Comment: 1st period after SAB  BMI 31.85 kg/m²    FHTs 141  Urine protein and glucose negative    Diagnoses and all orders for this visit:    1. 19 weeks gestation of pregnancy (Primary)    2. Multigravida of advanced maternal age in second trimester  IUP at 19 weeks gestation, doing well. RTC 4 weeks. Declines flu shot.

## 2024-11-25 ENCOUNTER — TELEPHONE (OUTPATIENT)
Dept: OBSTETRICS AND GYNECOLOGY | Age: 34
End: 2024-11-25

## 2024-11-25 NOTE — TELEPHONE ENCOUNTER
Caller: Wanda Rivers    Relationship to patient: Self    Best call back number: [phone]    Patient is needing: PATIENT CALLED AND STATED THAT SHE HASN'T BEEN FEELING WELL AND HAS BEEN MISSING QUITE A FEW HOURS OF WORK AND HER EMPLOYER IS GOING TO FAX OVER INTERMITTENT [employer] PAPERWORK THIS AFTERNOON, BUT SHE WAS TOLD TO CALL AND ASK HER PROVIDER IF THIS IS SOMETHING THAT WOULD BE APPROVED

## 2024-12-13 ENCOUNTER — ROUTINE PRENATAL (OUTPATIENT)
Age: 34
End: 2024-12-13
Payer: COMMERCIAL

## 2024-12-13 VITALS — SYSTOLIC BLOOD PRESSURE: 132 MMHG | WEIGHT: 176.7 LBS | BODY MASS INDEX: 34.51 KG/M2 | DIASTOLIC BLOOD PRESSURE: 92 MMHG

## 2024-12-13 DIAGNOSIS — O09.522 MULTIGRAVIDA OF ADVANCED MATERNAL AGE IN SECOND TRIMESTER: ICD-10-CM

## 2024-12-13 DIAGNOSIS — Z3A.23 23 WEEKS GESTATION OF PREGNANCY: Primary | ICD-10-CM

## 2024-12-13 LAB
GLUCOSE UR STRIP-MCNC: NEGATIVE MG/DL
PROT UR STRIP-MCNC: NEGATIVE MG/DL

## 2024-12-13 NOTE — PROGRESS NOTES
Reason for visit: Routine OB visit at 23w5d     CC: Reports frequent headaches which began in the occipital area, improves with Tylenol, but also causes nausea and vomiting.  She is also been feeling more nausea, which is persistent, over the last 2 weeks.  She has intermittent FMLA, but the time restrictions, especially with her appointment do not work, as she has to drive at least an hour each way for the appointment.  Endorses feeling fetal movement.     ROS: All systems reviewed and are negative with exception of the following: amenorrhea,     Weight 80.2 kg (176 lb 11.2 oz); /92; ; FH 25 cm   Total weight gain: 8.936 kg (19 lb 11.2 oz) with Total weight gain expected 5 kg (11 lb)-9 kg (19 lb)    Urine today and reviewed: negative glucose, negative protein    Ultrasound  19-week anatomy scan: anterior placenta, EFW 54%, no structural anomalies    Exam:  General Appearance:  No visualized signs of distress. Normal mood and behavior  Cardiorespiratory: HR str and reg. Lungs clear. Resp even and unlabored.  Abdomen: is soft and nontender. No CVA tenderness. Gravid uterus.  Ext: no edema. Calves non-tender.     Impression  Diagnoses and all orders for this visit:    1. 23 weeks gestation of pregnancy (Primary)  Reviewed with patient that FMLA time used on the front end of pregnancy usually reduces the amount of FMLA time for after delivery.  Reviewed with patient measures of support for nausea and headaches.  -     POC Urinalysis Dipstick    2. Multigravida of advanced maternal age in second trimester  Discussed second trimester of pregnancy, discomforts and measures of support, fetal movement,  labor warnings, preeclampsia warnings, and signs and symptoms to report. Also discussed with patient plan for hemoglobin and glucose tolerance testing. Instructions on glucose tolerance test and dietary intake prior to the test provided. Patient to notify provider or come to the hospital with vaginal  bleeding, leaking of fluid, contractions, or other concerns.           Refer to the AVS instructions for additional education provided with today's visit.         Return to the office in 4 weeks for routine prenatal visit with Dr. Cole and as needed with concerns.        This note has been signed electronically.    Fredia Wallis, DNP, APRN, CNM, RNC-OB

## 2025-01-14 ENCOUNTER — ROUTINE PRENATAL (OUTPATIENT)
Age: 35
End: 2025-01-14
Payer: COMMERCIAL

## 2025-01-14 VITALS — DIASTOLIC BLOOD PRESSURE: 88 MMHG | BODY MASS INDEX: 35.13 KG/M2 | WEIGHT: 179.9 LBS | SYSTOLIC BLOOD PRESSURE: 130 MMHG

## 2025-01-14 DIAGNOSIS — Z67.41 TYPE O BLOOD, RH NEGATIVE: ICD-10-CM

## 2025-01-14 DIAGNOSIS — Z3A.28 28 WEEKS GESTATION OF PREGNANCY: Primary | ICD-10-CM

## 2025-01-14 NOTE — PROGRESS NOTES
Having nausea but no vomiting. Denies VB, LOF, ctx. Reports good FM.  Noticed pressure at work.  Belly band recommended. 1 hour GTT today. Desires tdap.     /88   Wt 81.6 kg (179 lb 14.4 oz)   LMP 06/24/2024 Comment: 1st period after SAB  BMI 35.13 kg/m²    Fhts 131  Urine protein and glucose negative    Diagnoses and all orders for this visit:    1. 28 weeks gestation of pregnancy (Primary)  -     Rhogam Immune Globulin Immunization  -     RPR, Rfx Qn RPR / Confirm TP  -     Gestational Screen 1 Hr ([facility])  -     CBC & Differential  IUP at 28 weeks gestation, doing well. RTC 2 weeks. 1 hour GTT today. Rhogam today. Tdap today.   2. Type O blood, Rh negative  -     Rhogam Immune Globulin Immunization  -     Antibody Screen

## 2025-01-15 LAB
BASOPHILS # BLD AUTO: 0.1 X10E3/UL (ref 0–0.2)
BASOPHILS NFR BLD AUTO: 1 %
BLD GP AB SCN SERPL QL: NEGATIVE
EOSINOPHIL # BLD AUTO: 0.3 X10E3/UL (ref 0–0.4)
EOSINOPHIL NFR BLD AUTO: 1 %
ERYTHROCYTE [DISTWIDTH] IN BLOOD BY AUTOMATED COUNT: 12 % (ref 11.7–15.4)
GLUCOSE 1H P 50 G GLC PO SERPL-MCNC: 128 MG/DL (ref 70–139)
HCT VFR BLD AUTO: 33.8 % (ref 34–46.6)
HGB BLD-MCNC: 11.5 G/DL (ref 11.1–15.9)
IMM GRANULOCYTES # BLD AUTO: 0.4 X10E3/UL (ref 0–0.1)
IMM GRANULOCYTES NFR BLD AUTO: 2 %
LYMPHOCYTES # BLD AUTO: 3.3 X10E3/UL (ref 0.7–3.1)
LYMPHOCYTES NFR BLD AUTO: 18 %
MCH RBC QN AUTO: 33.8 PG (ref 26.6–33)
MCHC RBC AUTO-ENTMCNC: 34 G/DL (ref 31.5–35.7)
MCV RBC AUTO: 99 FL (ref 79–97)
MONOCYTES # BLD AUTO: 1.1 X10E3/UL (ref 0.1–0.9)
MONOCYTES NFR BLD AUTO: 6 %
NEUTROPHILS # BLD AUTO: 13.2 X10E3/UL (ref 1.4–7)
NEUTROPHILS NFR BLD AUTO: 72 %
PLATELET # BLD AUTO: 318 X10E3/UL (ref 150–450)
RBC # BLD AUTO: 3.4 X10E6/UL (ref 3.77–5.28)
RPR SER QL: NON REACTIVE
WBC # BLD AUTO: 18.4 X10E3/UL (ref 3.4–10.8)

## 2025-01-16 ENCOUNTER — TELEPHONE (OUTPATIENT)
Dept: OBSTETRICS AND GYNECOLOGY | Age: 35
End: 2025-01-16

## 2025-01-16 NOTE — TELEPHONE ENCOUNTER
Provider: TAMMIE VÁZQUEZ    Caller: Wanda Rivers    Relationship to Patient: Self    Phone Number: [phone]    Reason for Call: OB PT CALLED STATED SHE HAS HAD TO LEAVE WORK DUE TO HAVING LEFT SIDE RIB PAIN AND HAVING PAIN IN BOTH OF HER HIPS; PT STATED THE HIP PAIN STARTED YESTERDAY 01/15 AND THE RIB PAIN STARTED THIS MORNING; PT IS IN PAIN AND ALSO STATED SHE IS HAVING SOME NAUSEA; THIS IS PT FIRST BABY AND WANTS TO SPEAK TO TWAN REGARDING THESE CHANGES.    PLEASE CALL PT TO ADVISE.

## 2025-01-24 ENCOUNTER — ROUTINE PRENATAL (OUTPATIENT)
Age: 35
End: 2025-01-24
Payer: COMMERCIAL

## 2025-01-24 VITALS — BODY MASS INDEX: 35.35 KG/M2 | DIASTOLIC BLOOD PRESSURE: 82 MMHG | WEIGHT: 181 LBS | SYSTOLIC BLOOD PRESSURE: 124 MMHG

## 2025-01-24 DIAGNOSIS — Z3A.29 29 WEEKS GESTATION OF PREGNANCY: ICD-10-CM

## 2025-01-24 DIAGNOSIS — O26.899 PREGNANCY HEADACHE, ANTEPARTUM: ICD-10-CM

## 2025-01-24 DIAGNOSIS — O26.899 RH NEGATIVE, ANTEPARTUM: ICD-10-CM

## 2025-01-24 DIAGNOSIS — O09.523 MULTIGRAVIDA OF ADVANCED MATERNAL AGE IN THIRD TRIMESTER: Primary | ICD-10-CM

## 2025-01-24 DIAGNOSIS — O99.891 BACK PAIN AFFECTING PREGNANCY IN THIRD TRIMESTER: ICD-10-CM

## 2025-01-24 DIAGNOSIS — Z28.21 INFLUENZA VACCINATION DECLINED: ICD-10-CM

## 2025-01-24 DIAGNOSIS — O09.299 HISTORY OF MISCARRIAGE, CURRENTLY PREGNANT: ICD-10-CM

## 2025-01-24 DIAGNOSIS — Z67.91 RH NEGATIVE, ANTEPARTUM: ICD-10-CM

## 2025-01-24 DIAGNOSIS — M54.9 BACK PAIN AFFECTING PREGNANCY IN THIRD TRIMESTER: ICD-10-CM

## 2025-01-24 DIAGNOSIS — R51.9 PREGNANCY HEADACHE, ANTEPARTUM: ICD-10-CM

## 2025-01-24 LAB
GLUCOSE UR STRIP-MCNC: NEGATIVE MG/DL
PROT UR STRIP-MCNC: NEGATIVE MG/DL

## 2025-01-24 RX ORDER — CYCLOBENZAPRINE HCL 10 MG
10 TABLET ORAL EVERY 8 HOURS PRN
Qty: 30 TABLET | Refills: 1 | Status: SHIPPED | OUTPATIENT
Start: 2025-01-24 | End: 2026-01-24

## 2025-01-24 NOTE — PROGRESS NOTES
Reason for visit: Routine OB visit at 29w5d      CC:  Feels as though she is having a headache almost daily for the last week. Almost described like a tension or stress headache. Occurs a lot in her posterior head and neck. Having little to no help with Tylenol. Denies visual disturbances or epigastric pain. Endorses fetal movement.     ROS: All systems reviewed and are negative with exception of the following: amenorrhea, back pain, hip pain, headaches      /82   Wt 82.1 kg (181 lb)   LMP 2024   BMI 35.35 kg/m²   Total weight gain: 10.9 kg (24 lb)  Total weight gain expected 5 kg (11 lb)-9 kg (19 lb)    Prenatal Assessment  Fetal Heart Rate: 157  Fundal Height (cm): 29 cm  Movement: Present    Urine today and reviewed: negative glucose, negative protein    Ultrasound  19-week anatomy scan: anterior placenta; EFW 54%      Exam:  General Appearance:  No visualized signs of distress. Normal mood and behavior.  Cardiorespiratory: HR str and reg. Lungs clear. Breathing even and unlabored.  Abdomen: soft and nontender. No CVA tenderness. Gravid uterus.  Extremeties: no edema. Calves non-tender.         Impression  Diagnoses and all orders for this visit:    1. Multigravida of advanced maternal age in third trimester (Primary)  Reviewed hemoglobin and normal glucose. Fetal movement,  labor warnings, and signs to report discussed. Continue with prenatal vitamin. Include sources of iron in diet. To notify provider and/or come to the hospital with vaginal bleeding, suspicion for leaking of fluid, signs of regular or painful contractions or with other maternal or fetal concerns.     2. 29 weeks gestation of pregnancy  Practicing relaxation measures reviewed. Choosing pediatrician, childbirth education,  breastfeeding education, support group, and breast pump ordering reviewed.   -     POC Urinalysis Dipstick    3. History of miscarriage, currently pregnant    4. Influenza vaccination  declined  To practice self-care for immune health and be screened for influenza if experiencing symptoms.     5. Pregnancy headache, antepartum  With frequent weather changes did discuss use of daily antihistamine like Zyrtec. Discussed headache relief measures: avoiding triggers, aromatherapy, Tylenol OTC, Magnesium, epsom salt bath for relaxation, relaxation measures, massage, chiropractor, or consultation for OMT. Prescription for flexeril discussed and sent to pharmacy. To notify provider if symptoms persist or worsen.   -     cyclobenzaprine (FLEXERIL) 10 MG tablet; Take 1 tablet by mouth Every 8 (Eight) Hours As Needed for Muscle Spasms.  Dispense: 30 tablet; Refill: 1    6. Back pain affecting pregnancy in third trimester  Heat, yoga/stretching, Tylenol, analgesic rub or patches, massage, chiropractor, and OMT consultation discussed.   -     cyclobenzaprine (FLEXERIL) 10 MG tablet; Take 1 tablet by mouth Every 8 (Eight) Hours As Needed for Muscle Spasms.  Dispense: 30 tablet; Refill: 1    7. Rh negative, antepartum    Rhogam given at 28 weeks gestation.         Refer to the AVS instructions for additional education provided.         Return to the office in 2 weeks with Dr. Cole with 4D ultrasound and as needed with concerns.        This note has been signed electronically.    Freida Wallis, DNP, APRN, CNM

## 2025-02-02 PROBLEM — Z67.91 RH NEGATIVE, ANTEPARTUM: Status: ACTIVE | Noted: 2025-02-02

## 2025-02-02 PROBLEM — R51.9 PREGNANCY HEADACHE, ANTEPARTUM: Status: ACTIVE | Noted: 2025-02-02

## 2025-02-02 PROBLEM — M54.9 BACK PAIN AFFECTING PREGNANCY IN THIRD TRIMESTER: Status: ACTIVE | Noted: 2025-02-02

## 2025-02-02 PROBLEM — O26.899 RH NEGATIVE, ANTEPARTUM: Status: ACTIVE | Noted: 2025-02-02

## 2025-02-02 PROBLEM — O26.899 RH NEGATIVE, ANTEPARTUM: Status: ACTIVE | Noted: 2025-01-24

## 2025-02-02 PROBLEM — O99.891 BACK PAIN AFFECTING PREGNANCY IN THIRD TRIMESTER: Status: ACTIVE | Noted: 2025-01-24

## 2025-02-02 PROBLEM — M54.9 BACK PAIN AFFECTING PREGNANCY IN THIRD TRIMESTER: Status: ACTIVE | Noted: 2025-01-24

## 2025-02-02 PROBLEM — O26.899 PREGNANCY HEADACHE, ANTEPARTUM: Status: ACTIVE | Noted: 2025-01-24

## 2025-02-02 PROBLEM — Z67.91 RH NEGATIVE, ANTEPARTUM: Status: ACTIVE | Noted: 2025-01-24

## 2025-02-02 PROBLEM — R51.9 PREGNANCY HEADACHE, ANTEPARTUM: Status: ACTIVE | Noted: 2025-01-24

## 2025-02-02 PROBLEM — O99.891 BACK PAIN AFFECTING PREGNANCY IN THIRD TRIMESTER: Status: ACTIVE | Noted: 2025-02-02

## 2025-02-02 PROBLEM — O26.899 PREGNANCY HEADACHE, ANTEPARTUM: Status: ACTIVE | Noted: 2025-02-02

## 2025-02-07 ENCOUNTER — ROUTINE PRENATAL (OUTPATIENT)
Age: 35
End: 2025-02-07
Payer: COMMERCIAL

## 2025-02-07 ENCOUNTER — HOSPITAL ENCOUNTER (OUTPATIENT)
Facility: HOSPITAL | Age: 35
Setting detail: OBSERVATION
Discharge: HOME | End: 2025-02-07
Attending: OBSTETRICS & GYNECOLOGY | Admitting: OBSTETRICS & GYNECOLOGY
Payer: COMMERCIAL

## 2025-02-07 VITALS — WEIGHT: 186.1 LBS | SYSTOLIC BLOOD PRESSURE: 146 MMHG | BODY MASS INDEX: 36.35 KG/M2 | DIASTOLIC BLOOD PRESSURE: 96 MMHG

## 2025-02-07 VITALS
RESPIRATION RATE: 18 BRPM | HEIGHT: 60 IN | TEMPERATURE: 98.2 F | BODY MASS INDEX: 36.99 KG/M2 | SYSTOLIC BLOOD PRESSURE: 122 MMHG | WEIGHT: 188.4 LBS | DIASTOLIC BLOOD PRESSURE: 68 MMHG | HEART RATE: 80 BPM

## 2025-02-07 DIAGNOSIS — Z3A.31 31 WEEKS GESTATION OF PREGNANCY: Primary | ICD-10-CM

## 2025-02-07 DIAGNOSIS — O13.3 GESTATIONAL HYPERTENSION, THIRD TRIMESTER: ICD-10-CM

## 2025-02-07 LAB
ALBUMIN SERPL-MCNC: 3.3 G/DL (ref 3.5–5.2)
ALBUMIN/GLOB SERPL: 1.2 G/DL
ALP SERPL-CCNC: 90 U/L (ref 39–117)
ALT SERPL W P-5'-P-CCNC: 12 U/L (ref 1–33)
ANION GAP SERPL CALCULATED.3IONS-SCNC: 11 MMOL/L (ref 5–15)
AST SERPL-CCNC: 13 U/L (ref 1–32)
BASOPHILS # BLD AUTO: 0.07 10*3/MM3 (ref 0–0.2)
BASOPHILS NFR BLD AUTO: 0.5 % (ref 0–1.5)
BILIRUB SERPL-MCNC: 0.2 MG/DL (ref 0–1.2)
BUN SERPL-MCNC: 6 MG/DL (ref 6–20)
BUN/CREAT SERPL: 18.2 (ref 7–25)
CALCIUM SPEC-SCNC: 8.6 MG/DL (ref 8.6–10.5)
CHLORIDE SERPL-SCNC: 107 MMOL/L (ref 98–107)
CO2 SERPL-SCNC: 23 MMOL/L (ref 22–29)
CREAT SERPL-MCNC: 0.33 MG/DL (ref 0.57–1)
DEPRECATED RDW RBC AUTO: 44.4 FL (ref 37–54)
EGFRCR SERPLBLD CKD-EPI 2021: 138.8 ML/MIN/1.73
EOSINOPHIL # BLD AUTO: 0.2 10*3/MM3 (ref 0–0.4)
EOSINOPHIL NFR BLD AUTO: 1.4 % (ref 0.3–6.2)
ERYTHROCYTE [DISTWIDTH] IN BLOOD BY AUTOMATED COUNT: 12.8 % (ref 12.3–15.4)
GLOBULIN UR ELPH-MCNC: 2.7 GM/DL
GLUCOSE SERPL-MCNC: 93 MG/DL (ref 65–99)
HCT VFR BLD AUTO: 31.5 % (ref 34–46.6)
HGB BLD-MCNC: 11.1 G/DL (ref 12–15.9)
IMM GRANULOCYTES # BLD AUTO: 0.28 10*3/MM3 (ref 0–0.05)
IMM GRANULOCYTES NFR BLD AUTO: 1.9 % (ref 0–0.5)
LDH SERPL-CCNC: 147 U/L (ref 135–214)
LYMPHOCYTES # BLD AUTO: 2.71 10*3/MM3 (ref 0.7–3.1)
LYMPHOCYTES NFR BLD AUTO: 18.3 % (ref 19.6–45.3)
MCH RBC QN AUTO: 33.6 PG (ref 26.6–33)
MCHC RBC AUTO-ENTMCNC: 35.2 G/DL (ref 31.5–35.7)
MCV RBC AUTO: 95.5 FL (ref 79–97)
MONOCYTES # BLD AUTO: 1.12 10*3/MM3 (ref 0.1–0.9)
MONOCYTES NFR BLD AUTO: 7.6 % (ref 5–12)
NEUTROPHILS NFR BLD AUTO: 10.41 10*3/MM3 (ref 1.7–7)
NEUTROPHILS NFR BLD AUTO: 70.3 % (ref 42.7–76)
NRBC BLD AUTO-RTO: 0 /100 WBC (ref 0–0.2)
PLATELET # BLD AUTO: 292 10*3/MM3 (ref 140–450)
PMV BLD AUTO: 9.8 FL (ref 6–12)
POTASSIUM SERPL-SCNC: 3.6 MMOL/L (ref 3.5–5.2)
PROT SERPL-MCNC: 6 G/DL (ref 6–8.5)
RBC # BLD AUTO: 3.3 10*6/MM3 (ref 3.77–5.28)
SODIUM SERPL-SCNC: 141 MMOL/L (ref 136–145)
URATE SERPL-MCNC: 2.9 MG/DL (ref 2.4–5.7)
WBC NRBC COR # BLD AUTO: 14.79 10*3/MM3 (ref 3.4–10.8)

## 2025-02-07 PROCEDURE — G0463 HOSPITAL OUTPT CLINIC VISIT: HCPCS

## 2025-02-07 PROCEDURE — 80053 COMPREHEN METABOLIC PANEL: CPT | Performed by: OBSTETRICS & GYNECOLOGY

## 2025-02-07 PROCEDURE — 85025 COMPLETE CBC W/AUTO DIFF WBC: CPT | Performed by: OBSTETRICS & GYNECOLOGY

## 2025-02-07 PROCEDURE — 59025 FETAL NON-STRESS TEST: CPT

## 2025-02-07 PROCEDURE — 83615 LACTATE (LD) (LDH) ENZYME: CPT | Performed by: OBSTETRICS & GYNECOLOGY

## 2025-02-07 PROCEDURE — G0378 HOSPITAL OBSERVATION PER HR: HCPCS

## 2025-02-07 PROCEDURE — 84550 ASSAY OF BLOOD/URIC ACID: CPT | Performed by: OBSTETRICS & GYNECOLOGY

## 2025-02-07 NOTE — PROGRESS NOTES
Pt with recent increase in swelling. Still occa HA but unchanged from pre-pregnancy.  BP elevated today.  Suspect pt with component of CHTN but has never been on meds. UP trace.  US today with EFW 27%, 1742g, normal LUDIN.      /96   Wt 84.4 kg (186 lb 1.6 oz)   LMP 06/24/2024   BMI 36.35 kg/m²    Urine protein trace, urine glucose negative      Diagnoses and all orders for this visit:    1. 31 weeks gestation of pregnancy (Primary)    2. Gestational hypertension, third trimester  -     Protein / Creatinine Ratio, Urine - Urine, Clean Catch  BP elevated today.  To L&D for PIH work up, NST.  PCR sent from clinic today. RTC 1 week for BP check with BPP

## 2025-02-07 NOTE — NON STRESS TEST
Wanda Rivers, a  at 31w5d with an NORTH of 2025, by Ultrasound, was seen at [facility] for a nonstress test.    Chief Complaint   Patient presents with    Hypertension     NST, serial BP, PIH labs- sent from office        Patient Active Problem List   Diagnosis    GERD (gastroesophageal reflux disease)    Pregnancy    AMA (advanced maternal age) multigravida 35+    Pregnancy with history of miscarriage    Rh negative, antepartum    Back pain affecting pregnancy in third trimester    Pregnancy headache, antepartum       Start Time: 1130  Stop Time: 1150

## 2025-02-07 NOTE — NURSING NOTE
Written and verbal instructions given to patient educated on reasons to return to labor and delivery including increased frequency and strength of contractions, sudden gush/leaking of fluid, vaginal bleeding, decreased fetal movement, blurry vision/spots before eyes, epigastric pain, and headache unrelieved with Tylenol.

## 2025-02-14 ENCOUNTER — ROUTINE PRENATAL (OUTPATIENT)
Age: 35
End: 2025-02-14
Payer: COMMERCIAL

## 2025-02-14 VITALS — SYSTOLIC BLOOD PRESSURE: 142 MMHG | BODY MASS INDEX: 35.94 KG/M2 | DIASTOLIC BLOOD PRESSURE: 90 MMHG | WEIGHT: 184 LBS

## 2025-02-14 DIAGNOSIS — O09.523 MULTIGRAVIDA OF ADVANCED MATERNAL AGE IN THIRD TRIMESTER: Primary | ICD-10-CM

## 2025-02-14 DIAGNOSIS — O13.3 GESTATIONAL HYPERTENSION, THIRD TRIMESTER: ICD-10-CM

## 2025-02-14 DIAGNOSIS — Z67.91 RH NEGATIVE, ANTEPARTUM: ICD-10-CM

## 2025-02-14 DIAGNOSIS — O26.899 RH NEGATIVE, ANTEPARTUM: ICD-10-CM

## 2025-02-14 DIAGNOSIS — Z71.89 ENCOUNTER FOR ANTEPARTUM CONSULTATION REGARDING LACTATION: ICD-10-CM

## 2025-02-14 DIAGNOSIS — Z3A.32 32 WEEKS GESTATION OF PREGNANCY: ICD-10-CM

## 2025-02-14 LAB
GLUCOSE UR STRIP-MCNC: NEGATIVE MG/DL
PROT UR STRIP-MCNC: NEGATIVE MG/DL

## 2025-02-14 RX ORDER — BREAST PUMP
1 EACH MISCELLANEOUS AS NEEDED
Qty: 1 EACH | Refills: 0 | Status: SHIPPED | OUTPATIENT
Start: 2025-02-14

## 2025-02-14 NOTE — PROGRESS NOTES
Reason for visit: Routine OB visit at 32w5d      CC:  She continues to have pain in her tailbone and ribs. After standing at work all day, she has swelling and pain that makes it so uncomfortable to even walk. Endorses good fetal movement.     ROS: All systems reviewed and are negative with exception of the following: amenorrhea, hip pain, rib pain, tailbone pain, feet and leg swelling      /90   Wt 83.5 kg (184 lb)   LMP 2024   BMI 35.94 kg/m²   Total weight gain: 12.2 kg (27 lb)  Total weight gain expected 5 kg (11 lb)-9 kg (19 lb)    Prenatal Assessment  Fetal Heart Rate: 128  Fundal Height (cm): 32 cm  Movement: Present      Urine today and reviewed: negative glucose, negative protein    Ultrasound  32-week today: BPP 8/8; LUDIN 20.0 cm, DVP 7.4 cm; vertex; anterior placenta    31-week: EFW 27%; normal interval growth; anterior placenta      Exam:  General Appearance:  No visualized signs of distress. Normal mood and behavior.  Cardiorespiratory: HR str and reg. Lungs clear. Breathing even and unlabored.  Abdomen: soft and nontender. No CVA tenderness. Gravid uterus.  Extremeties: No edema. Calves non-tender.         Impression  Diagnoses and all orders for this visit:    1. Multigravida of advanced maternal age in third trimester (Primary)  Fetal movement,  labor warnings, measures of support for discomfort discussed.  To notify provider and/or come to the hospital with vaginal bleeding, suspicion for leaking fluid, regular painful contractions, or with other maternal or fetal concerns.    2. Gestational hypertension, third trimester  Blood pressure warning signs reviewed.  screening today with BPP reassuring. Negative urine protein. No signs of preeclampsia evidenced today.     3. 32 weeks gestation of pregnancy  Practicing relaxation measures for support during birthing experience discussed and encouraged. Possibly planning for pediatrician with her local practice in [place], [place].  Reviewed pediatricians for [facility] for care during hospital stay.   -     POC Urinalysis Dipstick  -     Misc. Devices (Breast Pump) misc; Use 1 Device As Needed (for breastfeeding).  Dispense: 1 each; Refill: 0    4. Rh negative, antepartum    5. Encounter for antepartum consultation regarding lactation  -     Misc. Devices (Breast Pump) misc; Use 1 Device As Needed (for breastfeeding).  Dispense: 1 each; Refill: 0        Refer to the AVS instructions for additional education provided.         Return to the office in 1 week for routine prenatal visit with Dr. Cole with P ultrasound for GHTN/CHTN and as needed with concerns.        This note has been signed electronically.    Freida Wallis, DNP, APRN, CNM

## 2025-02-21 ENCOUNTER — ROUTINE PRENATAL (OUTPATIENT)
Age: 35
End: 2025-02-21
Payer: COMMERCIAL

## 2025-02-21 VITALS — DIASTOLIC BLOOD PRESSURE: 84 MMHG | BODY MASS INDEX: 35.86 KG/M2 | WEIGHT: 183.6 LBS | SYSTOLIC BLOOD PRESSURE: 124 MMHG

## 2025-02-21 DIAGNOSIS — O13.3 GESTATIONAL HYPERTENSION, THIRD TRIMESTER: ICD-10-CM

## 2025-02-21 DIAGNOSIS — Z3A.33 33 WEEKS GESTATION OF PREGNANCY: Primary | ICD-10-CM

## 2025-02-21 NOTE — PROGRESS NOTES
No complaints. GFM. Denies PIH symptoms. Denies VB, LOF, ctx. BP normal, UP negative. BPP 8/8    /84   Wt 83.3 kg (183 lb 9.6 oz)   LMP 06/24/2024   BMI 35.86 kg/m²    Fhts 138  Urine protein and glucose negative    Diagnoses and all orders for this visit:    1. 33 weeks gestation of pregnancy (Primary)  IUP at 33 weeks gestation, doing well. RTC weekly due to GHTN.   2. Gestational hypertension, third trimester  BP normal without meds. UP negative. BPP 8/8

## 2025-02-25 ENCOUNTER — HOSPITAL ENCOUNTER (EMERGENCY)
Facility: HOSPITAL | Age: 35
Discharge: HOME | End: 2025-02-25
Attending: OBSTETRICS & GYNECOLOGY | Admitting: OBSTETRICS & GYNECOLOGY
Payer: COMMERCIAL

## 2025-02-25 ENCOUNTER — TELEPHONE (OUTPATIENT)
Dept: OBSTETRICS AND GYNECOLOGY | Age: 35
End: 2025-02-25
Payer: COMMERCIAL

## 2025-02-25 VITALS
SYSTOLIC BLOOD PRESSURE: 127 MMHG | RESPIRATION RATE: 18 BRPM | DIASTOLIC BLOOD PRESSURE: 75 MMHG | HEART RATE: 81 BPM | TEMPERATURE: 98 F

## 2025-02-25 LAB
BILIRUB UR QL STRIP: NEGATIVE
CLARITY UR: CLEAR
COLOR UR: YELLOW
GLUCOSE UR STRIP-MCNC: NEGATIVE MG/DL
HGB UR QL STRIP.AUTO: NEGATIVE
KETONES UR QL STRIP: NEGATIVE
LEUKOCYTE ESTERASE UR QL STRIP.AUTO: NEGATIVE
NITRITE UR QL STRIP: NEGATIVE
PH UR STRIP.AUTO: 6.5 [PH] (ref 5–8)
PROT UR QL STRIP: NEGATIVE
SP GR UR STRIP: 1.01 (ref 1–1.03)
UROBILINOGEN UR QL STRIP: NORMAL

## 2025-02-25 PROCEDURE — 99282 EMERGENCY DEPT VISIT SF MDM: CPT | Performed by: OBSTETRICS & GYNECOLOGY

## 2025-02-25 PROCEDURE — 81003 URINALYSIS AUTO W/O SCOPE: CPT | Performed by: OBSTETRICS & GYNECOLOGY

## 2025-02-25 PROCEDURE — 87086 URINE CULTURE/COLONY COUNT: CPT | Performed by: OBSTETRICS & GYNECOLOGY

## 2025-02-25 NOTE — TELEPHONE ENCOUNTER
Pt called and reports feeling hot, sweating, lower back and stomach pain. Reports she has also been monitoring blood pressure throughout pregnancy. Pt states manual blood pressure was 158 82 just now. Went to labor and delivery last Friday. Appt 2/28 with hanh. Denies any HA, Visual disturbances, lightheadedness, LOF, bleeding, contractions and reports good fetal movement today. Pt advised to go to LDR for evaluation. Pt voices understanding.

## 2025-02-25 NOTE — ED NOTES
[facility]   Wanda Rivers  : 1990  MRN: [id number]  CSN: [id number]    History and Physical    Subjective   Wanda Rivers is a 34 y.o.  34w2d presents to labor and delivery because she felt  sweaty and fast heart rate, took bp and it was elevated. Pt denies having had elevated bp's before  this pregnancy. In the last 2 weeks ,  pt reports they are watching closely.  Some labs done, no 24 hour urine collection. Today pt  reports good mov,  no headache, no blurry vision, no uti uri cns or covid symptoms.    Past Medical History:   Diagnosis Date    Anxiety     Depression     GERD (gastroesophageal reflux disease)      Past Surgical History:   Procedure Laterality Date    D & C WITH SUCTION N/A 2023    Procedure: DILATATION AND CURETTAGE WITH SUCTION;  Surgeon: Geovanna Cole MD;  Location: [facility];  Service: Obstetrics/Gynecology;  Laterality: N/A;    ENDOSCOPY      EPIGASTRIC HERNIA REPAIR      WISDOM TOOTH EXTRACTION       Social History    Tobacco Use      Smoking status: Every Day        Packs/day: 0.50        Years: 0.5 packs/day for 22.2 years (11.1 ttl pk-yrs)        Types: Cigarettes        Start date:       Smokeless tobacco: Never    No current facility-administered medications for this encounter.    Current Outpatient Medications:     acetaminophen (TYLENOL) 500 MG tablet, Take 2 tablets by mouth Every 6 (Six) Hours As Needed for Mild Pain., Disp: , Rfl:     cyclobenzaprine (FLEXERIL) 10 MG tablet, Take 1 tablet by mouth Every 8 (Eight) Hours As Needed for Muscle Spasms., Disp: 30 tablet, Rfl: 1    Prenatal MV-Min-Fe Fum-FA-DHA (PRENATAL 1 PO), Take 1 tablet/day by mouth Daily., Disp: , Rfl:     Misc. Devices (Breast Pump) misc, Use 1 Device As Needed (for breastfeeding)., Disp: 1 each, Rfl: 0    Allergies   Allergen Reactions    Penicillins Hives       Review of Systems      Objective   /75 (BP Location: Left arm)   Pulse 81   Temp 98 °F (36.7 °C)  (Oral)   Resp 18   LMP 06/24/2024   General: well developed; well nourished  no acute distress  mentation appropriate   Heart: regular rate and rhythm, S1, S2 normal, no murmur, click, rub or gallop   Lungs: breathing is unlabored   Abdomen: soft, non-tender; no masses  no umbilical or inguinal hernias are present  no hepato-splenomegaly   Pelvis:: Not performed.    Fht's cat 1, no contractions  Ua neg for protein   Visit: bp restored to normal values   Labs  Lab Results   Component Value Date     02/07/2025    HGB 11.1 (L) 02/07/2025    HCT 31.5 (L) 02/07/2025    WBC 14.79 (H) 02/07/2025     02/07/2025    K 3.6 02/07/2025     02/07/2025    CO2 23.0 02/07/2025    BUN 6 02/07/2025    CREATININE 0.33 (L) 02/07/2025    GLUCOSE 93 02/07/2025    ALBUMIN 3.3 (L) 02/07/2025    CALCIUM 8.6 02/07/2025    AST 13 02/07/2025    ALT 12 02/07/2025    BILITOT 0.2 02/07/2025        Assessment     34 week gestation  Possible hypoglycemia symptoms    The risks, benefits, and alternatives of the procedure; along with the risks of anesthesia was discussed in full with the patient and family and all questions were answered.       Plan   Pt reassured, instructions given    Michael Rebolledo MD  2/25/2025  15:52 CST

## 2025-02-26 LAB — BACTERIA SPEC AEROBE CULT: NO GROWTH

## 2025-02-28 ENCOUNTER — ROUTINE PRENATAL (OUTPATIENT)
Age: 35
End: 2025-02-28
Payer: COMMERCIAL

## 2025-02-28 VITALS — DIASTOLIC BLOOD PRESSURE: 82 MMHG | WEIGHT: 185 LBS | SYSTOLIC BLOOD PRESSURE: 146 MMHG | BODY MASS INDEX: 36.13 KG/M2

## 2025-02-28 DIAGNOSIS — Z3A.34 34 WEEKS GESTATION OF PREGNANCY: ICD-10-CM

## 2025-02-28 DIAGNOSIS — O10.913 CHRONIC HYPERTENSION IN OBSTETRIC CONTEXT IN THIRD TRIMESTER: Primary | ICD-10-CM

## 2025-02-28 DIAGNOSIS — O09.523 MULTIGRAVIDA OF ADVANCED MATERNAL AGE IN THIRD TRIMESTER: ICD-10-CM

## 2025-02-28 DIAGNOSIS — Z67.91 RH NEGATIVE, ANTEPARTUM: ICD-10-CM

## 2025-02-28 DIAGNOSIS — O26.899 RH NEGATIVE, ANTEPARTUM: ICD-10-CM

## 2025-02-28 LAB
GLUCOSE UR STRIP-MCNC: NEGATIVE MG/DL
PROT UR STRIP-MCNC: NEGATIVE MG/DL

## 2025-02-28 NOTE — PROGRESS NOTES
Reason for visit: Routine OB visit at 34w5d      CC:  Has had a hard time at work this week. She will be standing and then feel flushed and not feel well. Her blood pressure will be elevated at this time. She was last in LDR on 2025 for blood pressure concerns. Experiences pain in both hips. She has also been experiencing pain in the right rib area, but it does then extend down into the pelvic area. Having some headaches but denies visual changes at this time. Good fetal movement.     ROS: All systems reviewed and are negative with exception of the following: amenorrhea, headaches, right upper quadrant pain, flushing, pelvic pain, stress      /82   Wt 83.9 kg (185 lb)   LMP 2024   BMI 36.13 kg/m²   Total weight gain: 12.7 kg (28 lb)  Total weight gain expected 5 kg (11 lb)-9 kg (19 lb)    Prenatal Assessment  Fetal Heart Rate: 142  Movement: Present    Urine today and reviewed: negative glucose, negative protein    Ultrasound  34-week today: BPP 8/8; vertex; anterior placenta; LUDIN 19.0 cm, DVP 5.6 cm    31-week: EFW 27%; normal interval growth      Exam:  General Appearance:  No visualized signs of distress. Normal mood and behavior.  Cardiorespiratory: HR str and reg. Lungs clear. Breathing even and unlabored.  Abdomen: soft and nontender. No CVA tenderness. Gravid uterus.  Extremeties: no edema. Calves non-tender.         Impression  Diagnoses and all orders for this visit:    1. Chronic hypertension in obstetric context in third trimester (Primary)  -     CBC & Differential  -     Comprehensive Metabolic Panel  -     Uric Acid  -     Lactate Dehydrogenase  -     Protein / Creatinine Ratio, Urine - Urine, Clean Catch    2. Multigravida of advanced maternal age in third trimester    3. 34 weeks gestation of pregnancy  -     POC Urinalysis Dipstick    4. Rh negative, antepartum    Blood pressure warning signs, fetal movement,  labor precautions, measures of relaxation, signs of labor, and  signs to report discussed. Will complete labs to day for assessment of renal and liver function.  Received rhogam 2025. To practice relaxation measures for coping with stress and then also practice relaxation measures for use during birth experience. To notify provider and/or come to the hospital with vaginal bleeding, blood pressure warning signs, suspicion for leaking fluid, regular or painful contractions, or with other maternal-fetal concerns.         Refer to the AVS instructions for additional education provided.         Return to the office in 1 week for routine prenatal visit with Dr. Cole with interval growth and  screen (BPP) ultrasound and as needed with concerns.        This note has been signed electronically.    Freida Wallis, NOE, APRN, CNM

## 2025-03-01 LAB
ALBUMIN SERPL-MCNC: 3.7 G/DL (ref 3.9–4.9)
ALP SERPL-CCNC: 135 IU/L (ref 44–121)
ALT SERPL-CCNC: 11 IU/L (ref 0–32)
AST SERPL-CCNC: 15 IU/L (ref 0–40)
BASOPHILS # BLD AUTO: 0.1 X10E3/UL (ref 0–0.2)
BASOPHILS NFR BLD AUTO: 1 %
BILIRUB SERPL-MCNC: <0.2 MG/DL (ref 0–1.2)
BUN SERPL-MCNC: 5 MG/DL (ref 6–20)
BUN/CREAT SERPL: 11 (ref 9–23)
CALCIUM SERPL-MCNC: 9 MG/DL (ref 8.7–10.2)
CHLORIDE SERPL-SCNC: 103 MMOL/L (ref 96–106)
CO2 SERPL-SCNC: 21 MMOL/L (ref 20–29)
CREAT SERPL-MCNC: 0.46 MG/DL (ref 0.57–1)
CREAT UR-MCNC: 77 MG/DL
EGFRCR SERPLBLD CKD-EPI 2021: 128 ML/MIN/1.73
EOSINOPHIL # BLD AUTO: 0.2 X10E3/UL (ref 0–0.4)
EOSINOPHIL NFR BLD AUTO: 2 %
ERYTHROCYTE [DISTWIDTH] IN BLOOD BY AUTOMATED COUNT: 12.1 % (ref 11.7–15.4)
GLOBULIN SER CALC-MCNC: 2.3 G/DL (ref 1.5–4.5)
GLUCOSE SERPL-MCNC: 92 MG/DL (ref 70–99)
HCT VFR BLD AUTO: 35.3 % (ref 34–46.6)
HGB BLD-MCNC: 12.3 G/DL (ref 11.1–15.9)
IMM GRANULOCYTES # BLD AUTO: 0.2 X10E3/UL (ref 0–0.1)
IMM GRANULOCYTES NFR BLD AUTO: 2 %
LDH SERPL L TO P-CCNC: 184 IU/L (ref 119–226)
LYMPHOCYTES # BLD AUTO: 3.2 X10E3/UL (ref 0.7–3.1)
LYMPHOCYTES NFR BLD AUTO: 22 %
MCH RBC QN AUTO: 34 PG (ref 26.6–33)
MCHC RBC AUTO-ENTMCNC: 34.8 G/DL (ref 31.5–35.7)
MCV RBC AUTO: 98 FL (ref 79–97)
MONOCYTES # BLD AUTO: 1.2 X10E3/UL (ref 0.1–0.9)
MONOCYTES NFR BLD AUTO: 9 %
NEUTROPHILS # BLD AUTO: 9.5 X10E3/UL (ref 1.4–7)
NEUTROPHILS NFR BLD AUTO: 64 %
PLATELET # BLD AUTO: 329 X10E3/UL (ref 150–450)
POTASSIUM SERPL-SCNC: 3.8 MMOL/L (ref 3.5–5.2)
PROT SERPL-MCNC: 6 G/DL (ref 6–8.5)
PROT UR-MCNC: 13 MG/DL
PROT/CREAT UR: 169 MG/G CREAT (ref 0–200)
RBC # BLD AUTO: 3.62 X10E6/UL (ref 3.77–5.28)
SODIUM SERPL-SCNC: 139 MMOL/L (ref 134–144)
URATE SERPL-MCNC: 2.8 MG/DL (ref 2.6–6.2)
WBC # BLD AUTO: 14.4 X10E3/UL (ref 3.4–10.8)

## 2025-03-03 ENCOUNTER — TELEPHONE (OUTPATIENT)
Dept: OBSTETRICS AND GYNECOLOGY | Age: 35
End: 2025-03-03
Payer: COMMERCIAL

## 2025-03-03 NOTE — TELEPHONE ENCOUNTER
Pt called reporting vomiting, chest congestion, sore throat, and fever. Pt reports unable to keep any fluids down. Pt advised to go to walk in clinic if able to be further evaluated. Pt also advised to drink plenty of fluids if able. Pt voiced understanding.

## 2025-03-07 ENCOUNTER — ROUTINE PRENATAL (OUTPATIENT)
Age: 35
End: 2025-03-07
Payer: COMMERCIAL

## 2025-03-07 ENCOUNTER — HOSPITAL ENCOUNTER (OUTPATIENT)
Facility: HOSPITAL | Age: 35
Setting detail: OBSERVATION
Discharge: HOME | End: 2025-03-08
Attending: OBSTETRICS & GYNECOLOGY | Admitting: OBSTETRICS & GYNECOLOGY
Payer: COMMERCIAL

## 2025-03-07 VITALS — BODY MASS INDEX: 35.39 KG/M2 | WEIGHT: 181.2 LBS | DIASTOLIC BLOOD PRESSURE: 90 MMHG | SYSTOLIC BLOOD PRESSURE: 138 MMHG

## 2025-03-07 DIAGNOSIS — O10.913 CHRONIC HYPERTENSION IN OBSTETRIC CONTEXT IN THIRD TRIMESTER: Primary | ICD-10-CM

## 2025-03-07 DIAGNOSIS — Z67.91 RH NEGATIVE, ANTEPARTUM: ICD-10-CM

## 2025-03-07 DIAGNOSIS — Z3A.35 35 WEEKS GESTATION OF PREGNANCY: ICD-10-CM

## 2025-03-07 DIAGNOSIS — O26.899 RH NEGATIVE, ANTEPARTUM: ICD-10-CM

## 2025-03-07 DIAGNOSIS — O09.523 MULTIGRAVIDA OF ADVANCED MATERNAL AGE IN THIRD TRIMESTER: ICD-10-CM

## 2025-03-07 PROBLEM — Z36.89 NST (NON-STRESS TEST) REACTIVE ON FETAL SURVEILLANCE: Status: ACTIVE | Noted: 2025-03-07

## 2025-03-07 LAB
ALBUMIN SERPL-MCNC: 3.2 G/DL (ref 3.5–5.2)
ALBUMIN/GLOB SERPL: 1 G/DL
ALP SERPL-CCNC: 151 U/L (ref 39–117)
ALT SERPL W P-5'-P-CCNC: 29 U/L (ref 1–33)
ANION GAP SERPL CALCULATED.3IONS-SCNC: 14 MMOL/L (ref 5–15)
AST SERPL-CCNC: 38 U/L (ref 1–32)
BACTERIA UR QL AUTO: ABNORMAL /HPF
BASOPHILS # BLD MANUAL: 0 10*3/MM3 (ref 0–0.2)
BASOPHILS NFR BLD MANUAL: 0 % (ref 0–1.5)
BILIRUB SERPL-MCNC: 0.2 MG/DL (ref 0–1.2)
BILIRUB UR QL STRIP: NEGATIVE
BUN SERPL-MCNC: 6 MG/DL (ref 6–20)
BUN/CREAT SERPL: 11.1 (ref 7–25)
CALCIUM SPEC-SCNC: 8.7 MG/DL (ref 8.6–10.5)
CHLORIDE SERPL-SCNC: 101 MMOL/L (ref 98–107)
CLARITY UR: ABNORMAL
CO2 SERPL-SCNC: 19 MMOL/L (ref 22–29)
COLOR UR: ABNORMAL
CREAT SERPL-MCNC: 0.54 MG/DL (ref 0.57–1)
DEPRECATED RDW RBC AUTO: 44.2 FL (ref 37–54)
EGFRCR SERPLBLD CKD-EPI 2021: 123.3 ML/MIN/1.73
EOSINOPHIL # BLD MANUAL: 0 10*3/MM3 (ref 0–0.4)
EOSINOPHIL NFR BLD MANUAL: 0 % (ref 0.3–6.2)
ERYTHROCYTE [DISTWIDTH] IN BLOOD BY AUTOMATED COUNT: 12.8 % (ref 12.3–15.4)
GLOBULIN UR ELPH-MCNC: 3.1 GM/DL
GLUCOSE SERPL-MCNC: 90 MG/DL (ref 65–99)
GLUCOSE UR STRIP-MCNC: NEGATIVE MG/DL
GLUCOSE UR STRIP-MCNC: NEGATIVE MG/DL
HCT VFR BLD AUTO: 37.3 % (ref 34–46.6)
HGB BLD-MCNC: 12.9 G/DL (ref 12–15.9)
HGB UR QL STRIP.AUTO: NEGATIVE
HYALINE CASTS UR QL AUTO: ABNORMAL /LPF
KETONES UR QL STRIP: ABNORMAL
LDH SERPL-CCNC: 235 U/L (ref 135–214)
LEUKOCYTE ESTERASE UR QL STRIP.AUTO: ABNORMAL
LYMPHOCYTES # BLD MANUAL: 0.91 10*3/MM3 (ref 0.7–3.1)
LYMPHOCYTES NFR BLD MANUAL: 11.2 % (ref 5–12)
MCH RBC QN AUTO: 32.7 PG (ref 26.6–33)
MCHC RBC AUTO-ENTMCNC: 34.6 G/DL (ref 31.5–35.7)
MCV RBC AUTO: 94.7 FL (ref 79–97)
METAMYELOCYTES NFR BLD MANUAL: 2 % (ref 0–0)
MONOCYTES # BLD: 0.83 10*3/MM3 (ref 0.1–0.9)
NEUTROPHILS # BLD AUTO: 5.55 10*3/MM3 (ref 1.7–7)
NEUTROPHILS NFR BLD MANUAL: 62.2 % (ref 42.7–76)
NEUTS BAND NFR BLD MANUAL: 12.2 % (ref 0–5)
NITRITE UR QL STRIP: NEGATIVE
PH UR STRIP.AUTO: 6 [PH] (ref 5–8)
PLAT MORPH BLD: NORMAL
PLATELET # BLD AUTO: 248 10*3/MM3 (ref 140–450)
PMV BLD AUTO: 10.4 FL (ref 6–12)
POIKILOCYTOSIS BLD QL SMEAR: ABNORMAL
POTASSIUM SERPL-SCNC: 3.4 MMOL/L (ref 3.5–5.2)
PROT SERPL-MCNC: 6.3 G/DL (ref 6–8.5)
PROT UR QL STRIP: ABNORMAL
PROT UR STRIP-MCNC: ABNORMAL MG/DL
RBC # BLD AUTO: 3.94 10*6/MM3 (ref 3.77–5.28)
RBC # UR STRIP: ABNORMAL /HPF
REF LAB TEST METHOD: ABNORMAL
SODIUM SERPL-SCNC: 134 MMOL/L (ref 136–145)
SP GR UR STRIP: 1.02 (ref 1–1.03)
SQUAMOUS #/AREA URNS HPF: ABNORMAL /HPF
URATE SERPL-MCNC: 5.1 MG/DL (ref 2.4–5.7)
UROBILINOGEN UR QL STRIP: ABNORMAL
VARIANT LYMPHS NFR BLD MANUAL: 10.2 % (ref 19.6–45.3)
VARIANT LYMPHS NFR BLD MANUAL: 2 % (ref 0–5)
WBC # UR STRIP: ABNORMAL /HPF
WBC MORPH BLD: NORMAL
WBC NRBC COR # BLD AUTO: 7.45 10*3/MM3 (ref 3.4–10.8)

## 2025-03-07 PROCEDURE — 85027 COMPLETE CBC AUTOMATED: CPT | Performed by: OBSTETRICS & GYNECOLOGY

## 2025-03-07 PROCEDURE — 87086 URINE CULTURE/COLONY COUNT: CPT | Performed by: OBSTETRICS & GYNECOLOGY

## 2025-03-07 PROCEDURE — 81001 URINALYSIS AUTO W/SCOPE: CPT | Performed by: OBSTETRICS & GYNECOLOGY

## 2025-03-07 PROCEDURE — G0379 DIRECT REFER HOSPITAL OBSERV: HCPCS

## 2025-03-07 PROCEDURE — 85007 BL SMEAR W/DIFF WBC COUNT: CPT | Performed by: OBSTETRICS & GYNECOLOGY

## 2025-03-07 PROCEDURE — 83615 LACTATE (LD) (LDH) ENZYME: CPT | Performed by: OBSTETRICS & GYNECOLOGY

## 2025-03-07 PROCEDURE — 96372 THER/PROPH/DIAG INJ SC/IM: CPT

## 2025-03-07 PROCEDURE — G0378 HOSPITAL OBSERVATION PER HR: HCPCS

## 2025-03-07 PROCEDURE — 84550 ASSAY OF BLOOD/URIC ACID: CPT | Performed by: OBSTETRICS & GYNECOLOGY

## 2025-03-07 PROCEDURE — 25010000002 BETAMETHASONE SODIUM PHOSPHATE 12 MG/2ML SOLUTION: Performed by: ADVANCED PRACTICE MIDWIFE

## 2025-03-07 PROCEDURE — 59025 FETAL NON-STRESS TEST: CPT

## 2025-03-07 PROCEDURE — 80053 COMPREHEN METABOLIC PANEL: CPT | Performed by: OBSTETRICS & GYNECOLOGY

## 2025-03-07 RX ORDER — CYCLOBENZAPRINE HCL 10 MG
10 TABLET ORAL ONCE
Status: COMPLETED | OUTPATIENT
Start: 2025-03-07 | End: 2025-03-07

## 2025-03-07 RX ORDER — GUAIFENESIN 200 MG/10ML
200 LIQUID ORAL EVERY 4 HOURS PRN
Status: DISCONTINUED | OUTPATIENT
Start: 2025-03-07 | End: 2025-03-08 | Stop reason: HOSPADM

## 2025-03-07 RX ORDER — CETIRIZINE HYDROCHLORIDE 5 MG/1
5 TABLET ORAL DAILY
Status: ON HOLD | COMMUNITY

## 2025-03-07 RX ORDER — ALLOPURINOL 300 MG/1
1 TABLET ORAL AS NEEDED
COMMUNITY
Start: 2025-03-03

## 2025-03-07 RX ORDER — BETAMETHASONE SOD PHOSPH-WATER 6 MG/ML
12 VIAL (ML) INJECTION EVERY 24 HOURS
Status: COMPLETED | OUTPATIENT
Start: 2025-03-07 | End: 2025-03-08

## 2025-03-07 RX ORDER — DEXTROMETHORPHAN POLISTIREX 30 MG/5ML
30 SUSPENSION ORAL EVERY 12 HOURS SCHEDULED
COMMUNITY
Start: 2025-03-03

## 2025-03-07 RX ORDER — ACETAMINOPHEN 325 MG/1
650 TABLET ORAL EVERY 6 HOURS PRN
Status: DISCONTINUED | OUTPATIENT
Start: 2025-03-07 | End: 2025-03-08 | Stop reason: HOSPADM

## 2025-03-07 RX ADMIN — Medication 12 MG: at 11:21

## 2025-03-07 RX ADMIN — GUAIFENESIN 200 MG: 200 SOLUTION ORAL at 21:10

## 2025-03-07 RX ADMIN — CYCLOBENZAPRINE HYDROCHLORIDE 10 MG: 10 TABLET, FILM COATED ORAL at 20:08

## 2025-03-07 NOTE — PROGRESS NOTES
Reason for visit: Routine OB visit at 35w5d      CC:  Has had a cough since Sunday. She is coughing up white to gray sputum. Has decreased smoking to not smoking at all with the cough. Believes she has coughed so much she could have broken a rib on the left side, as it is painful. Even painful with movement. Good fetal movement.     ROS: All systems reviewed and are negative with exception of the following: amenorrhea, productive cough, fatigue, left sided rib pain      /90   Wt 82.2 kg (181 lb 3.2 oz)   LMP 06/24/2024   BMI 35.39 kg/m²   Total weight gain: 11 kg (24 lb 3.2 oz)  Total weight gain expected 5 kg (11 lb)-9 kg (19 lb)    Prenatal Assessment  Fetal Heart Rate: 126  Fundal Height (cm): 35 cm  Movement: Present  Presentation: Vertex    Urine today and reviewed: negative glucose, trace protein    Ultrasound  35-week today: EFW 41%; AC 45%; LUDIN 18.8 cm, DVP 5.6 cm; normal interval growth; BPP 6/8 (0 for no sniffing motion); vertex presentation; anterior placenta      Exam:  General Appearance:  No visualized signs of distress. Normal mood and behavior.  Cardiorespiratory: HR str and reg. Lungs clear. Breathing even and unlabored.  Abdomen: soft and nontender. No CVA tenderness. Gravid uterus.  Extremeties: No edema. Calves non-tender.         Impression  Diagnoses and all orders for this visit:    1. Chronic hypertension in obstetric context in third trimester (Primary)    2. Multigravida of advanced maternal age in third trimester    3. 35 weeks gestation of pregnancy  -     POC Urinalysis Dipstick    4. Rh negative, antepartum    Ultrasound today and reviewed: normal interval growth but BPP 6/8. Discussed with patient plan for observation in LDR with NST and labs for elevated pressure with trace urine protein today. Continue practicing relaxation measures. Discussed comfort measures for cough. Discussed fetal movement, blood pressure warning signs, and signs to report.     RAFI Robison RN phoned with  plan for NST, serial blood pressures, and labs. Spoke with Dr. Cole for collaboration for care and will consider betamethasone x 2 doses      Refer to the AVS instructions for additional education provided.         Return to the office in 4 days with BPP with Dr Cole for CHTN in pregnancy if not delivered prior and as needed with concerns.        This note has been signed electronically.    Freida Wallis, NOE, APRN, CNM

## 2025-03-07 NOTE — H&P
[facility]  Wanda Rivers  : 1990  MRN: [id number]  CSN: [id number]    Antepartum Progress Note    Subjective   Wanda Rivers is a 34 y.o. year old  with an Estimated Date of Delivery: 25 currently at 35w5d who was sent to labor and delivery for further monitoring and testing after a  BPP  screen. She has had a intermittent, productive cough since . She reports screening negative for Covid and influenza this week. Denies headache, visual disturbances, or right upper quadrant pain. She has had left-sided rib pain secondary to the coughing. Denies contractions, leaking of fluid, or vaginal bleeding.     Prenatal care has been with Dr. Geovanna Cole and SLAVA Wallis. Prenatal care has been notable for:  Advance maternal age  Multigravida with only 2 previous SAB  Low risk NIPT and negative maternal carrier screening.   Chronic hypertension in  the obstetric context - not on medication  Rh negative with rhogam given 2025  Tobacco use in pregnancy - decreased use with recent productive cough/respiratory symptoms.          Objective     Min/max vitals past 24 hours:   Temp  Min: 98 °F (36.7 °C)  Max: 98 °F (36.7 °C)  BP  Min: 121/87  Max: 138/90  Pulse  Min: 71  Max: 109  Resp  Min: 16  Max: 16         General: no acute distress  mentation appropriate   Heart: regular rate and rhythm  S1, S2 normal   Lungs: Respirations even and unlabored. Clear lung sounds bilateral. Intermittent non-productive cough in office   Abdomen: soft, non-tender; no masses  no umbilical or inguinal hernias are present  no hepato-splenomegaly  Gravid uterus   FHT's: reassuring, reactive, and category 1   Cervix: was not checked.   Contractions: irregular   Back: bilateral CVA tenderness is absent           Assessment   IUP at 35w5d   GBS unknown  Chronic hypertension in obstetric context  Abnormal  screening in clinic BPP  (0 for no breathing/sniffing motion). EFW 41%  (2668 g) at 35w5d  Intermittent cough  Tobacco use in pregnancy  Rh negative with rhogam received in 3rd trimester.      Plan   24-hour urine (Uric acid 5.1 is increased from 1 week ago and elevated AST 38 is also increased from 1 week ago).  Repeat labs in AM   Betamethasone x 2 doses, 1st today.   Serial monitoring of blood pressures.  Maternal-fetal monitoring to continue.       Collaboration with Dr. Cole when patient sent to LDR initially and then again with admission.       Freida Wallis, TAMMIE  3/7/2025  12:47 CST

## 2025-03-08 VITALS
TEMPERATURE: 97.9 F | RESPIRATION RATE: 14 BRPM | WEIGHT: 185.2 LBS | HEIGHT: 60 IN | HEART RATE: 73 BPM | OXYGEN SATURATION: 96 % | SYSTOLIC BLOOD PRESSURE: 114 MMHG | BODY MASS INDEX: 36.36 KG/M2 | DIASTOLIC BLOOD PRESSURE: 71 MMHG

## 2025-03-08 LAB
ALBUMIN SERPL-MCNC: 3.2 G/DL (ref 3.5–5.2)
ALBUMIN/GLOB SERPL: 1.1 G/DL
ALP SERPL-CCNC: 146 U/L (ref 39–117)
ALT SERPL W P-5'-P-CCNC: 29 U/L (ref 1–33)
ANION GAP SERPL CALCULATED.3IONS-SCNC: 13 MMOL/L (ref 5–15)
AST SERPL-CCNC: 34 U/L (ref 1–32)
BACTERIA SPEC AEROBE CULT: NO GROWTH
BASOPHILS # BLD MANUAL: 0 10*3/MM3 (ref 0–0.2)
BASOPHILS NFR BLD MANUAL: 0 % (ref 0–1.5)
BILIRUB SERPL-MCNC: 0.2 MG/DL (ref 0–1.2)
BUN SERPL-MCNC: 6 MG/DL (ref 6–20)
BUN/CREAT SERPL: 11.8 (ref 7–25)
CALCIUM SPEC-SCNC: 8.6 MG/DL (ref 8.6–10.5)
CHLORIDE SERPL-SCNC: 103 MMOL/L (ref 98–107)
CO2 SERPL-SCNC: 21 MMOL/L (ref 22–29)
COLLECT DURATION TIME UR: 24 HRS
CREAT SERPL-MCNC: 0.51 MG/DL (ref 0.57–1)
DEPRECATED RDW RBC AUTO: 42.6 FL (ref 37–54)
EGFRCR SERPLBLD CKD-EPI 2021: 125 ML/MIN/1.73
EOSINOPHIL # BLD MANUAL: 0 10*3/MM3 (ref 0–0.4)
EOSINOPHIL NFR BLD MANUAL: 0 % (ref 0.3–6.2)
ERYTHROCYTE [DISTWIDTH] IN BLOOD BY AUTOMATED COUNT: 12.7 % (ref 12.3–15.4)
GLOBULIN UR ELPH-MCNC: 2.8 GM/DL
GLUCOSE SERPL-MCNC: 86 MG/DL (ref 65–99)
HCT VFR BLD AUTO: 35.8 % (ref 34–46.6)
HGB BLD-MCNC: 12.7 G/DL (ref 12–15.9)
LDH SERPL-CCNC: 204 U/L (ref 135–214)
LYMPHOCYTES # BLD MANUAL: 1.76 10*3/MM3 (ref 0.7–3.1)
LYMPHOCYTES NFR BLD MANUAL: 10 % (ref 5–12)
MCH RBC QN AUTO: 32.7 PG (ref 26.6–33)
MCHC RBC AUTO-ENTMCNC: 35.5 G/DL (ref 31.5–35.7)
MCV RBC AUTO: 92.3 FL (ref 79–97)
MONOCYTES # BLD: 0.98 10*3/MM3 (ref 0.1–0.9)
NEUTROPHILS # BLD AUTO: 7.06 10*3/MM3 (ref 1.7–7)
NEUTROPHILS NFR BLD MANUAL: 64 % (ref 42.7–76)
NEUTS BAND NFR BLD MANUAL: 8 % (ref 0–5)
PLAT MORPH BLD: NORMAL
PLATELET # BLD AUTO: 242 10*3/MM3 (ref 140–450)
PMV BLD AUTO: 10.2 FL (ref 6–12)
POTASSIUM SERPL-SCNC: 4 MMOL/L (ref 3.5–5.2)
PROT 24H UR-MRATE: 172.9 MG/24HOURS (ref 0–150)
PROT SERPL-MCNC: 6 G/DL (ref 6–8.5)
RBC # BLD AUTO: 3.88 10*6/MM3 (ref 3.77–5.28)
RBC MORPH BLD: NORMAL
SODIUM SERPL-SCNC: 137 MMOL/L (ref 136–145)
SPECIMEN VOL 24H UR: 1300 ML
URATE SERPL-MCNC: 5.2 MG/DL (ref 2.4–5.7)
VARIANT LYMPHS NFR BLD MANUAL: 11 % (ref 19.6–45.3)
VARIANT LYMPHS NFR BLD MANUAL: 7 % (ref 0–5)
WBC MORPH BLD: NORMAL
WBC NRBC COR # BLD AUTO: 9.8 10*3/MM3 (ref 3.4–10.8)

## 2025-03-08 PROCEDURE — 83615 LACTATE (LD) (LDH) ENZYME: CPT | Performed by: ADVANCED PRACTICE MIDWIFE

## 2025-03-08 PROCEDURE — 84156 ASSAY OF PROTEIN URINE: CPT | Performed by: OBSTETRICS & GYNECOLOGY

## 2025-03-08 PROCEDURE — 85007 BL SMEAR W/DIFF WBC COUNT: CPT | Performed by: ADVANCED PRACTICE MIDWIFE

## 2025-03-08 PROCEDURE — 96372 THER/PROPH/DIAG INJ SC/IM: CPT

## 2025-03-08 PROCEDURE — 81050 URINALYSIS VOLUME MEASURE: CPT | Performed by: OBSTETRICS & GYNECOLOGY

## 2025-03-08 PROCEDURE — 59025 FETAL NON-STRESS TEST: CPT

## 2025-03-08 PROCEDURE — 85027 COMPLETE CBC AUTOMATED: CPT | Performed by: ADVANCED PRACTICE MIDWIFE

## 2025-03-08 PROCEDURE — 80053 COMPREHEN METABOLIC PANEL: CPT | Performed by: ADVANCED PRACTICE MIDWIFE

## 2025-03-08 PROCEDURE — 84550 ASSAY OF BLOOD/URIC ACID: CPT | Performed by: ADVANCED PRACTICE MIDWIFE

## 2025-03-08 PROCEDURE — 25010000002 BETAMETHASONE SODIUM PHOSPHATE 12 MG/2ML SOLUTION: Performed by: ADVANCED PRACTICE MIDWIFE

## 2025-03-08 PROCEDURE — G0378 HOSPITAL OBSERVATION PER HR: HCPCS

## 2025-03-08 RX ADMIN — Medication 12 MG: at 11:10

## 2025-03-08 NOTE — PROGRESS NOTES
Geovanna Cole MD  [facility]  [address]  [place] [address]  Office [phone]  Fax [phone]      [facility]  Wanda Rivers  : 1990  MRN: [id number]  CSN: [id number]    Antepartum Progress note    Subjective   She reports is having no problems. No PIH symptoms     Objective   Min/max vitals past 24 hours:  Temp  Min: 97.5 °F (36.4 °C)  Max: 98 °F (36.7 °C)   BP  Min: 101/54  Max: 143/83   Pulse  Min: 71  Max: 93   Resp  Min: 14  Max: 16        General: Well Developed, Well Nourished, not in acute distress   Gastrointestinal: gravid, soft, no TTP, no rebound tenderness or guarding to palpation, no palpable ctx   Skin: warm & dry, no cyanosis, no jaundice      Fetal Monitoring  FHT's: reactive and category 1.  external monitors used   Contractions: none      Assessment   IUP at 35w6d  Elevated BP in clinic admitted to rule out preeclampsia  Elevated LDH, Uric acid and AST yesterday, all normalized today  Steroids for FLM     Plan   1.   24 hour urine collection up this am. Will await results of 24 hour urine.  BP has been normal and labs are improved. Will finish steroid course for FLM.  Anticipate d/c today with close follow up outpt.     Geovanna Cole MD  3/8/2025  10:04 CST

## 2025-03-08 NOTE — NON STRESS TEST
Wanda Rivers, a  at 35w6d with an NORTH of 2025, by Ultrasound, was seen at [facility] for a nonstress test.    Chief Complaint   Patient presents with    Non-stress Test    Elevated Blood Pressure       Patient Active Problem List   Diagnosis    GERD (gastroesophageal reflux disease)    Pregnancy    AMA (advanced maternal age) multigravida 35+    Pregnancy with history of miscarriage    Rh negative, antepartum    Back pain affecting pregnancy in third trimester    Pregnancy headache, antepartum    Chronic hypertension in obstetric context in third trimester    NST (non-stress test) reactive on fetal surveillance       Start Time: 903  Stop Time: 924    Interpretation A  Nonstress Test Interpretation A: Reactive  Comments A: Per RAFI Robison RNC and ANNELIESE Garvin RNC

## 2025-03-08 NOTE — DISCHARGE SUMMARY
[facility]    Geovanna Cole MD  [address]  [place] [address]  [phone]      Discharge Summary      Wanda Rivers  : 1990  MRN: [id number]  CSN: [id number]    Date of Admission: 3/7/2025   Date of Discharge:  3/8/2025           Admission Diagnosis: NST (non-stress test) reactive on fetal surveillance [Z36.89]  BPP 6/8   Elevated BP     Discharge Diagnosis: same             Presenting Problem/History of Present Illness  Active Hospital Problems    Diagnosis  POA    **NST (non-stress test) reactive on fetal surveillance [Z36.89]  Not Applicable      Resolved Hospital Problems   No resolved problems to display.        Hospital Course  Patient is a 34 y.o.  who presented for routine visit in clinic. BPP 6/8 with elevated BP. Pt was sent to L&D for monitoring. BP normalized, however her labs were slightly abnormal.  NST reactive. Pt was kept for 24 hour urine collection and steroids for FLM.  BP has remained normal during admission. Awaiting 24 hour urine collection. Pt is asymptomatic. Pt has recently had a URI is feeling better. Her AST was mildly elevated but trending downward.  This finding could be from preeclampsia or from recent viral illness.     Procedures Performed      Consults:   Consults       No orders found for last 30 day(s).            Condition on Discharge:  Stable    Vital Signs  Temp:  [97.5 °F (36.4 °C)-98 °F (36.7 °C)] 97.9 °F (36.6 °C)  Heart Rate:  [71-93] 73  Resp:  [14-16] 14  BP: (101-143)/(54-88) 114/71    Physical Exam:   No exam performed today,    Discharge Disposition  Stable for discharge home     Discharge Medications     Discharge Medications        Continue These Medications        Instructions Start Date   Breast Pump misc   1 Device, Not Applicable, As Needed             ASK your doctor about these medications        Instructions Start Date   acetaminophen 500 MG tablet  Commonly known as: TYLENOL   1,000 mg,  Every 6 Hours PRN      Ayr 0.65 % nasal spray  Generic drug: sodium chloride   1 spray, As Needed      cetirizine 5 MG tablet  Commonly known as: zyrTEC   5 mg, Daily      cyclobenzaprine 10 MG tablet  Commonly known as: FLEXERIL   10 mg, Oral, Every 8 Hours PRN      EQ Cough DM 30 MG/5ML Suspension Extended Release oral suspension  Generic drug: dextromethorphan polistirex ER   30 mg, Every 12 Hours Scheduled      PRENATAL 1 PO   1 tablet/day, Daily               Discharge Diet: regular home diet    Activity at Discharge: bedrest    Follow-up Appointments  Future Appointments   Date Time Provider Department Center   3/11/2025  9:00 AM US 4 [facility]   3/11/2025  9:45 AM Randy Allen MD [facility]  [facility]   3/14/2025  1:00 PM [facility] US 2 [facility]  [facility]   3/14/2025  1:45 PM Freida Wallis APRN [facility]  [facility]   3/21/2025 10:00 AM [facility] 103 US 1 [facility]  [facility]   3/21/2025 10:45 AM Geovanna Cole MD [facility]  [facility]         Test Results Pending at Discharge  Pending Labs       Order Current Status    Urine Culture - Urine, Urine, Clean Catch In process             Geovanna Cole MD  03/08/25  10:08 CST    Time: Discharge <30 min

## 2025-03-08 NOTE — NON STRESS TEST
Wanda Rivers, a  at 35w5d with an NORTH of 2025, by Ultrasound, was seen at [facility] for a nonstress test.    Chief Complaint   Patient presents with    Non-stress Test    Elevated Blood Pressure       Patient Active Problem List   Diagnosis    GERD (gastroesophageal reflux disease)    Pregnancy    AMA (advanced maternal age) multigravida 35+    Pregnancy with history of miscarriage    Rh negative, antepartum    Back pain affecting pregnancy in third trimester    Pregnancy headache, antepartum    Chronic hypertension in obstetric context in third trimester    NST (non-stress test) reactive on fetal surveillance       Start Time:   Stop Time:     Interpretation A  Nonstress Test Interpretation A: Reactive  Comments A: Per AGNIESZKA Murray RN and CRISS Martin RN

## 2025-03-11 ENCOUNTER — PREP FOR SURGERY (OUTPATIENT)
Dept: OTHER | Facility: HOSPITAL | Age: 35
End: 2025-03-11
Payer: COMMERCIAL

## 2025-03-11 ENCOUNTER — ROUTINE PRENATAL (OUTPATIENT)
Age: 35
End: 2025-03-11
Payer: COMMERCIAL

## 2025-03-11 VITALS
DIASTOLIC BLOOD PRESSURE: 104 MMHG | BODY MASS INDEX: 35.58 KG/M2 | SYSTOLIC BLOOD PRESSURE: 146 MMHG | WEIGHT: 182.2 LBS

## 2025-03-11 DIAGNOSIS — O26.899 RH NEGATIVE, ANTEPARTUM: ICD-10-CM

## 2025-03-11 DIAGNOSIS — O09.523 MULTIGRAVIDA OF ADVANCED MATERNAL AGE IN THIRD TRIMESTER: ICD-10-CM

## 2025-03-11 DIAGNOSIS — Z3A.36 36 WEEKS GESTATION OF PREGNANCY: Primary | ICD-10-CM

## 2025-03-11 DIAGNOSIS — O10.913 CHRONIC HYPERTENSION IN OBSTETRIC CONTEXT IN THIRD TRIMESTER: ICD-10-CM

## 2025-03-11 DIAGNOSIS — Z67.91 RH NEGATIVE, ANTEPARTUM: ICD-10-CM

## 2025-03-11 LAB
GLUCOSE UR STRIP-MCNC: NEGATIVE MG/DL
PROT UR STRIP-MCNC: ABNORMAL MG/DL

## 2025-03-11 RX ORDER — CARBOPROST TROMETHAMINE 250 UG/ML
250 INJECTION, SOLUTION INTRAMUSCULAR AS NEEDED
OUTPATIENT
Start: 2025-03-11

## 2025-03-11 RX ORDER — SODIUM CHLORIDE 0.9 % (FLUSH) 0.9 %
10 SYRINGE (ML) INJECTION AS NEEDED
Status: CANCELLED | OUTPATIENT
Start: 2025-03-11

## 2025-03-11 RX ORDER — ACETAMINOPHEN 325 MG/1
650 TABLET ORAL EVERY 4 HOURS PRN
Status: CANCELLED | OUTPATIENT
Start: 2025-03-11

## 2025-03-11 RX ORDER — VANCOMYCIN 1.75 GRAM/500 ML IN 0.9 % SODIUM CHLORIDE INTRAVENOUS
20 EVERY 12 HOURS
Status: CANCELLED | OUTPATIENT
Start: 2025-03-11 | End: 2025-03-12

## 2025-03-11 RX ORDER — SODIUM CHLORIDE 0.9 % (FLUSH) 0.9 %
10 SYRINGE (ML) INJECTION EVERY 12 HOURS SCHEDULED
Status: CANCELLED | OUTPATIENT
Start: 2025-03-11

## 2025-03-11 RX ORDER — ONDANSETRON 2 MG/ML
4 INJECTION INTRAMUSCULAR; INTRAVENOUS EVERY 6 HOURS PRN
OUTPATIENT
Start: 2025-03-11

## 2025-03-11 RX ORDER — OXYTOCIN/0.9 % SODIUM CHLORIDE 30/500 ML
999 PLASTIC BAG, INJECTION (ML) INTRAVENOUS ONCE
OUTPATIENT
Start: 2025-03-11 | End: 2025-03-11

## 2025-03-11 RX ORDER — METHYLERGONOVINE MALEATE 0.2 MG/ML
200 INJECTION INTRAVENOUS ONCE AS NEEDED
OUTPATIENT
Start: 2025-03-11

## 2025-03-11 RX ORDER — BUTORPHANOL TARTRATE 1 MG/ML
1 INJECTION, SOLUTION INTRAMUSCULAR; INTRAVENOUS
Status: CANCELLED | OUTPATIENT
Start: 2025-03-11

## 2025-03-11 RX ORDER — SODIUM CHLORIDE, SODIUM LACTATE, POTASSIUM CHLORIDE, CALCIUM CHLORIDE 600; 310; 30; 20 MG/100ML; MG/100ML; MG/100ML; MG/100ML
125 INJECTION, SOLUTION INTRAVENOUS CONTINUOUS
Status: CANCELLED | OUTPATIENT
Start: 2025-03-11 | End: 2025-03-11

## 2025-03-11 RX ORDER — SODIUM CHLORIDE 9 MG/ML
40 INJECTION, SOLUTION INTRAVENOUS AS NEEDED
Status: CANCELLED | OUTPATIENT
Start: 2025-03-11

## 2025-03-11 RX ORDER — BUTORPHANOL TARTRATE 2 MG/ML
2 INJECTION, SOLUTION INTRAMUSCULAR; INTRAVENOUS
Status: CANCELLED | OUTPATIENT
Start: 2025-03-11

## 2025-03-11 RX ORDER — ONDANSETRON 4 MG/1
4 TABLET, ORALLY DISINTEGRATING ORAL EVERY 6 HOURS PRN
OUTPATIENT
Start: 2025-03-11

## 2025-03-11 RX ORDER — ONDANSETRON 4 MG/1
4 TABLET, ORALLY DISINTEGRATING ORAL EVERY 6 HOURS PRN
Status: CANCELLED | OUTPATIENT
Start: 2025-03-11

## 2025-03-11 RX ORDER — OXYTOCIN/0.9 % SODIUM CHLORIDE 30/500 ML
2-20 PLASTIC BAG, INJECTION (ML) INTRAVENOUS
Status: CANCELLED | OUTPATIENT
Start: 2025-03-11

## 2025-03-11 RX ORDER — CITRIC ACID/SODIUM CITRATE 334-500MG
30 SOLUTION, ORAL ORAL ONCE AS NEEDED
Status: CANCELLED | OUTPATIENT
Start: 2025-03-11

## 2025-03-11 RX ORDER — OXYTOCIN/0.9 % SODIUM CHLORIDE 30/500 ML
250 PLASTIC BAG, INJECTION (ML) INTRAVENOUS CONTINUOUS
OUTPATIENT
Start: 2025-03-11 | End: 2025-03-11

## 2025-03-11 RX ORDER — TERBUTALINE SULFATE 1 MG/ML
0.25 INJECTION SUBCUTANEOUS AS NEEDED
Status: CANCELLED | OUTPATIENT
Start: 2025-03-11

## 2025-03-11 RX ORDER — IBUPROFEN 600 MG/1
600 TABLET, FILM COATED ORAL EVERY 6 HOURS PRN
OUTPATIENT
Start: 2025-03-11

## 2025-03-11 RX ORDER — ONDANSETRON 2 MG/ML
4 INJECTION INTRAMUSCULAR; INTRAVENOUS EVERY 6 HOURS PRN
Status: CANCELLED | OUTPATIENT
Start: 2025-03-11

## 2025-03-11 RX ORDER — MISOPROSTOL 200 UG/1
800 TABLET ORAL AS NEEDED
OUTPATIENT
Start: 2025-03-11

## 2025-03-11 RX ORDER — CALCIUM CARBONATE 500 MG/1
2 TABLET, CHEWABLE ORAL 3 TIMES DAILY PRN
OUTPATIENT
Start: 2025-03-11

## 2025-03-11 NOTE — PROGRESS NOTES
Reason for visit: Routine OB visit at 36w2d     CC:  Here for routine OBV.  Patient reports normal fetal movement and has no symptoms of labor ( or otherwise), denies VB.  No signs or symptoms of pregnancy induced hypertension.    ROS: All systems reviewed and are negative with exception of the following    Vitals, urine, FHT, and exam noted also in the prenatal flow sheet    Exam  HEENT: NCAT, EMOI  Abdomen is soft and nontender.  Uterus is consistent with EGA  Ext are NT  Reflexes normal  No edema  No clonus    Cx 50/1/-3 and ballotable    Impression  Diagnoses and all orders for this visit:    1. 36 weeks gestation of pregnancy (Primary)  -     POC Urinalysis Dipstick    2. Chronic hypertension in obstetric context in third trimester    3. Multigravida of advanced maternal age in third trimester    4. Rh negative, antepartum              Kick count instructions were reviewed and encouraged.  Labor signs and symptoms were reviewed.  Patient was encouraged to come to hospital or call for bleeding, leakage of fluid or any other concerns.    Preeclampsia warnings were also reviewed with the patient.    Cultures collected      Ultrasound for next appt BPP    RTO later this week for repeat appt and scheduling of YUE Allen MD

## 2025-03-11 NOTE — H&P (VIEW-ONLY)
[facility]  Wanda Rivers  : 1990  MRN: [id number]  CSN: [id number]    History and Physical    Subjective   Wanda Rivers is a 34 y.o. year old  with an Estimated Date of Delivery: 25 scheduled for induction of labor on 3/17/2025 due to  gestational HTN, poorly controlled. Pt admitted 3/7 with elevated BP. 24 hour urine normal however pt with elevated uric acid and LDH.  Pt reports HA that has resolved.  Received steroids for FLM .  Prenatal care has been with Dr. Geovanna Cole.  It has been complicated by gestational hypertension.    OB History    Para Term  AB Living   3 0 0 0 2 0   SAB IAB Ectopic Molar Multiple Live Births   2 0 0 0 0 0      # Outcome Date GA Lbr See/2nd Weight Sex Type Anes PTL Lv   3 Current            2 SAB 24 5w0d          1 SAB 23 7w0d     Gen       Past Medical History:   Diagnosis Date    Anxiety     Depression     GERD (gastroesophageal reflux disease)     Hypertension      Past Surgical History:   Procedure Laterality Date    D & C WITH SUCTION N/A 2023    Procedure: DILATATION AND CURETTAGE WITH SUCTION;  Surgeon: Geovanna Cole MD;  Location: [facility];  Service: Obstetrics/Gynecology;  Laterality: N/A;    ENDOSCOPY      EPIGASTRIC HERNIA REPAIR      WISDOM TOOTH EXTRACTION         Current Outpatient Medications:     acetaminophen (TYLENOL) 500 MG tablet, Take 2 tablets by mouth Every 6 (Six) Hours As Needed for Mild Pain., Disp: , Rfl:     cetirizine (zyrTEC) 5 MG tablet, Take 1 tablet by mouth Daily., Disp: , Rfl:     cyclobenzaprine (FLEXERIL) 10 MG tablet, Take 1 tablet by mouth Every 8 (Eight) Hours As Needed for Muscle Spasms., Disp: 30 tablet, Rfl: 1    EQ Cough DM 30 MG/5ML Suspension Extended Release oral suspension, Take 5 mL by mouth Every 12 (Twelve) Hours., Disp: , Rfl:     Misc. Devices (Breast Pump) misc, Use 1 Device As Needed (for breastfeeding)., Disp: 1 each, Rfl: 0    Prenatal MV-Min-Fe  Fum-FA-DHA (PRENATAL 1 PO), Take 1 tablet/day by mouth Daily., Disp: , Rfl:     Allergies   Allergen Reactions    Penicillins Hives     Social History    Tobacco Use      Smoking status: Every Day        Packs/day: 0.50        Years: 0.5 packs/day for 22.2 years (11.1 ttl pk-yrs)        Types: Cigarettes        Start date:       Smokeless tobacco: Never    Review of Systems      Objective   LMP 2024   General: well developed; well nourished  no acute distress  mentation appropriate   Heart: regular rate and rhythm, S1, S2 normal, no murmur, click, rub or gallop   Lungs: breathing is unlabored   Abdomen:  Cervix:  Presentation:  EFW by Leopold's:  EFW by recent u/s: soft, non-tender; no masses  no umbilical or inguinal hernias are present  no hepato-splenomegaly  was checked (by Alejandro): 1 cm / 50 % / -2  Vertex         Prenatal Labs  Lab Results   Component Value Date    HGB 12.7 2025    HEPBSAG Negative 2024    ABO O 2024    RH Negative 2024    ABSCRN Negative 2025    [id number] Non Reactive 2024    URINECX No growth 2025       Recent Labs  Lab Results   Component Value Date    HGB 12.7 2025    HCT 35.8 2025    WBC 9.80 2025     2025           Assessment   IUP with an Estimated Date of Delivery: 25  Induction of labor scheduled on 3/17/2025 at 37 weeks gestation due to gestational HTN, poorly controlled.  The patient's pelvis feels clinically adequate for IOL to be appropriate, although she understands that this clinical judgement is not always accurate.    Group B Strep status: pending         Plan   Risks and benefits of induction discussed.  Patient understands that IOL increases the risk of  delivery over spontaneous labor, especially if the patient does not have a favorable cervix.  Cervix 1/50/soft per Dr. Allen. Will start with pitocin induction.  Anticipate .     Geovanna Cole MD  3/11/2025

## 2025-03-11 NOTE — H&P
[facility]  Wanda Rivers  : 1990  MRN: [id number]  CSN: [id number]    History and Physical    Subjective   Wanda Rivers is a 34 y.o. year old  with an Estimated Date of Delivery: 25 scheduled for induction of labor on 3/17/2025 due to  gestational HTN, poorly controlled. Pt admitted 3/7 with elevated BP. 24 hour urine normal however pt with elevated uric acid and LDH.  Pt reports HA that has resolved.  Received steroids for FLM .  Prenatal care has been with Dr. Geovanna Cole.  It has been complicated by gestational hypertension.    OB History    Para Term  AB Living   3 0 0 0 2 0   SAB IAB Ectopic Molar Multiple Live Births   2 0 0 0 0 0      # Outcome Date GA Lbr See/2nd Weight Sex Type Anes PTL Lv   3 Current            2 SAB 24 5w0d          1 SAB 23 7w0d     Gen       Past Medical History:   Diagnosis Date    Anxiety     Depression     GERD (gastroesophageal reflux disease)     Hypertension      Past Surgical History:   Procedure Laterality Date    D & C WITH SUCTION N/A 2023    Procedure: DILATATION AND CURETTAGE WITH SUCTION;  Surgeon: Geovanna Cole MD;  Location: [facility];  Service: Obstetrics/Gynecology;  Laterality: N/A;    ENDOSCOPY      EPIGASTRIC HERNIA REPAIR      WISDOM TOOTH EXTRACTION         Current Outpatient Medications:     acetaminophen (TYLENOL) 500 MG tablet, Take 2 tablets by mouth Every 6 (Six) Hours As Needed for Mild Pain., Disp: , Rfl:     cetirizine (zyrTEC) 5 MG tablet, Take 1 tablet by mouth Daily., Disp: , Rfl:     cyclobenzaprine (FLEXERIL) 10 MG tablet, Take 1 tablet by mouth Every 8 (Eight) Hours As Needed for Muscle Spasms., Disp: 30 tablet, Rfl: 1    EQ Cough DM 30 MG/5ML Suspension Extended Release oral suspension, Take 5 mL by mouth Every 12 (Twelve) Hours., Disp: , Rfl:     Misc. Devices (Breast Pump) misc, Use 1 Device As Needed (for breastfeeding)., Disp: 1 each, Rfl: 0    Prenatal MV-Min-Fe  Fum-FA-DHA (PRENATAL 1 PO), Take 1 tablet/day by mouth Daily., Disp: , Rfl:     Allergies   Allergen Reactions    Penicillins Hives     Social History    Tobacco Use      Smoking status: Every Day        Packs/day: 0.50        Years: 0.5 packs/day for 22.2 years (11.1 ttl pk-yrs)        Types: Cigarettes        Start date:       Smokeless tobacco: Never    Review of Systems      Objective   LMP 2024   General: well developed; well nourished  no acute distress  mentation appropriate   Heart: regular rate and rhythm, S1, S2 normal, no murmur, click, rub or gallop   Lungs: breathing is unlabored   Abdomen:  Cervix:  Presentation:  EFW by Leopold's:  EFW by recent u/s: soft, non-tender; no masses  no umbilical or inguinal hernias are present  no hepato-splenomegaly  was checked (by Alejandro): 1 cm / 50 % / -2  Vertex         Prenatal Labs  Lab Results   Component Value Date    HGB 12.7 2025    HEPBSAG Negative 2024    ABO O 2024    RH Negative 2024    ABSCRN Negative 2025    [id number] Non Reactive 2024    URINECX No growth 2025       Recent Labs  Lab Results   Component Value Date    HGB 12.7 2025    HCT 35.8 2025    WBC 9.80 2025     2025           Assessment   IUP with an Estimated Date of Delivery: 25  Induction of labor scheduled on 3/17/2025 at 37 weeks gestation due to gestational HTN, poorly controlled.  The patient's pelvis feels clinically adequate for IOL to be appropriate, although she understands that this clinical judgement is not always accurate.    Group B Strep status: pending         Plan   Risks and benefits of induction discussed.  Patient understands that IOL increases the risk of  delivery over spontaneous labor, especially if the patient does not have a favorable cervix.  Cervix 1/50/soft per Dr. Allen. Will start with pitocin induction.  Anticipate .     Geovanna Cole MD  3/11/2025

## 2025-03-14 ENCOUNTER — ROUTINE PRENATAL (OUTPATIENT)
Age: 35
End: 2025-03-14
Payer: COMMERCIAL

## 2025-03-14 VITALS — BODY MASS INDEX: 34.76 KG/M2 | SYSTOLIC BLOOD PRESSURE: 142 MMHG | WEIGHT: 178 LBS | DIASTOLIC BLOOD PRESSURE: 90 MMHG

## 2025-03-14 DIAGNOSIS — O26.899 RH NEGATIVE, ANTEPARTUM: ICD-10-CM

## 2025-03-14 DIAGNOSIS — O09.523 MULTIGRAVIDA OF ADVANCED MATERNAL AGE IN THIRD TRIMESTER: ICD-10-CM

## 2025-03-14 DIAGNOSIS — Z3A.36 36 WEEKS GESTATION OF PREGNANCY: ICD-10-CM

## 2025-03-14 DIAGNOSIS — O10.913 CHRONIC HYPERTENSION IN OBSTETRIC CONTEXT IN THIRD TRIMESTER: Primary | ICD-10-CM

## 2025-03-14 DIAGNOSIS — Z67.91 RH NEGATIVE, ANTEPARTUM: ICD-10-CM

## 2025-03-14 LAB
GLUCOSE UR STRIP-MCNC: NEGATIVE MG/DL
PROT UR STRIP-MCNC: ABNORMAL MG/DL

## 2025-03-14 RX ORDER — PSEUDOEPHEDRINE HCL 120 MG/1
120 TABLET, FILM COATED, EXTENDED RELEASE ORAL EVERY 12 HOURS
Status: ON HOLD | COMMUNITY

## 2025-03-14 NOTE — PROGRESS NOTES
Reason for visit: Routine OB visit at 36w5d      CC:  Denies concerns at this time or signs of labor. Good fetal movement.     ROS: All systems reviewed and are negative with exception of the following: amenorrhea, occasional headache - starts in neck, cough, fatigue      /90   Wt 80.7 kg (178 lb)   LMP 2024   BMI 34.76 kg/m²   Total weight gain: 9.526 kg (21 lb)  Total weight gain expected 5 kg (11 lb)-9 kg (19 lb)    Prenatal Assessment  Fetal Heart Rate: 153  Fundal Height (cm): 35 cm  Movement: Present  Presentation: Vertex    Urine today and reviewed: negative glucose, trace protein    Ultrasound  36-week today: BPP 8/8; LUDNI 12.4 cm, DVP 6.5 cm; vertex presentation    35-week: EFW 41%; normal interval growth      Exam:  General Appearance:  No visualized signs of distress. Normal mood and behavior.  Cardiorespiratory: HR str and reg. Lungs clear. Breathing even and unlabored.  Abdomen: soft and nontender. No CVA tenderness. Gravid uterus.  Extremeties: no edema. Calves non-tender.   : medium cervix  Dilation/Effacement/Station  Dilation:  (1-2)  Effacement (%): 50  Station: -3        Impression  Diagnoses and all orders for this visit:    1. Chronic hypertension in obstetric context in third trimester (Primary)    2. Multigravida of advanced maternal age in third trimester    3. 36 weeks gestation of pregnancy  -     POC Urinalysis Dipstick    4. Rh negative, antepartum    Ultrasound today: BPP 8/8 - reassuring. Normal interval growth at 35 weeks gestation. Has been scheduled by Dr. Cole for induction of labor at 37 weeks gestation. Risks and benefits of induction discussed. Patient understands that IOL increases the risk of  delivery over spontaneous labor, especially if the patient does not have a favorable cervix. Discussed plan which includes Cytotec to begin with for cervical ripening. To practice measures of relaxation for support during birth experience. Measures to promote  labor also encouraged. Fetal movement, blood pressure warning signs, signs of labor, and other signs to report discussed. To notify provider and/or come to the hospital with vaginal bleeding, suspicion for leaking fluid, regular or painful contractions, or other maternal-fetal concerns. Plan for patient to arrive in LDR at 2000 for IOL to begin with Cytotec.           Refer to the AVS instructions for additional education provided.         Return to the office in 2 weeks postpartum for postpartum mood and blood pressure check and as needed with concerns.        This note has been signed electronically.    Freida Wallis, DNP, APRN, CNM

## 2025-03-15 LAB
C TRACH RRNA SPEC QL NAA+PROBE: NEGATIVE
GP B STREP DNA SPEC QL NAA+PROBE: NEGATIVE
N GONORRHOEA RRNA SPEC QL NAA+PROBE: NEGATIVE

## 2025-03-16 ENCOUNTER — HOSPITAL ENCOUNTER (INPATIENT)
Facility: HOSPITAL | Age: 35
LOS: 3 days | Discharge: HOME | End: 2025-03-19
Attending: OBSTETRICS & GYNECOLOGY | Admitting: OBSTETRICS & GYNECOLOGY
Payer: COMMERCIAL

## 2025-03-16 ENCOUNTER — HOSPITAL ENCOUNTER (OUTPATIENT)
Dept: LABOR AND DELIVERY | Facility: HOSPITAL | Age: 35
Discharge: HOME | End: 2025-03-16
Payer: COMMERCIAL

## 2025-03-16 DIAGNOSIS — O13.3 GESTATIONAL HYPERTENSION WITHOUT SIGNIFICANT PROTEINURIA IN THIRD TRIMESTER: Primary | ICD-10-CM

## 2025-03-16 LAB
ABO GROUP BLD: NORMAL
AMPHET+METHAMPHET UR QL: NEGATIVE
AMPHETAMINES UR QL: NEGATIVE
BARBITURATES UR QL SCN: NEGATIVE
BASOPHILS # BLD AUTO: 0.11 10*3/MM3 (ref 0–0.2)
BASOPHILS NFR BLD AUTO: 0.8 % (ref 0–1.5)
BENZODIAZ UR QL SCN: NEGATIVE
BLD GP AB SCN SERPL QL: POSITIVE
BUPRENORPHINE SERPL-MCNC: NEGATIVE NG/ML
CANNABINOIDS SERPL QL: POSITIVE
COCAINE UR QL: NEGATIVE
DEPRECATED RDW RBC AUTO: 43.8 FL (ref 37–54)
EOSINOPHIL # BLD AUTO: 0.23 10*3/MM3 (ref 0–0.4)
EOSINOPHIL NFR BLD AUTO: 1.7 % (ref 0.3–6.2)
ERYTHROCYTE [DISTWIDTH] IN BLOOD BY AUTOMATED COUNT: 12.7 % (ref 12.3–15.4)
EXPIRATION DATE: ABNORMAL
FENTANYL UR-MCNC: NEGATIVE NG/ML
HCT VFR BLD AUTO: 38.8 % (ref 34–46.6)
HGB BLD-MCNC: 13.4 G/DL (ref 12–15.9)
IMM GRANULOCYTES # BLD AUTO: 0.18 10*3/MM3 (ref 0–0.05)
IMM GRANULOCYTES NFR BLD AUTO: 1.3 % (ref 0–0.5)
LYMPHOCYTES # BLD AUTO: 3.24 10*3/MM3 (ref 0.7–3.1)
LYMPHOCYTES NFR BLD AUTO: 23.3 % (ref 19.6–45.3)
Lab: ABNORMAL
MCH RBC QN AUTO: 32.7 PG (ref 26.6–33)
MCHC RBC AUTO-ENTMCNC: 34.5 G/DL (ref 31.5–35.7)
MCV RBC AUTO: 94.6 FL (ref 79–97)
METHADONE UR QL SCN: NEGATIVE
MONOCYTES # BLD AUTO: 1.28 10*3/MM3 (ref 0.1–0.9)
MONOCYTES NFR BLD AUTO: 9.2 % (ref 5–12)
NEUTROPHILS NFR BLD AUTO: 63.7 % (ref 42.7–76)
NEUTROPHILS NFR BLD AUTO: 8.84 10*3/MM3 (ref 1.7–7)
NRBC BLD AUTO-RTO: 0 /100 WBC (ref 0–0.2)
OPIATES UR QL: NEGATIVE
OXYCODONE UR QL SCN: NEGATIVE
PCP UR QL SCN: NEGATIVE
PLATELET # BLD AUTO: 436 10*3/MM3 (ref 140–450)
PMV BLD AUTO: 10.8 FL (ref 6–12)
PROT UR STRIP-MCNC: ABNORMAL MG/DL
RBC # BLD AUTO: 4.1 10*6/MM3 (ref 3.77–5.28)
RESIDUAL RHIG DETECTED: NORMAL
RH BLD: NEGATIVE
T&S EXPIRATION DATE: NORMAL
TRICYCLICS UR QL SCN: POSITIVE
WBC NRBC COR # BLD AUTO: 13.88 10*3/MM3 (ref 3.4–10.8)

## 2025-03-16 PROCEDURE — G0480 DRUG TEST DEF 1-7 CLASSES: HCPCS | Performed by: OBSTETRICS & GYNECOLOGY

## 2025-03-16 PROCEDURE — 81002 URINALYSIS NONAUTO W/O SCOPE: CPT | Performed by: OBSTETRICS & GYNECOLOGY

## 2025-03-16 PROCEDURE — 85025 COMPLETE CBC W/AUTO DIFF WBC: CPT | Performed by: OBSTETRICS & GYNECOLOGY

## 2025-03-16 PROCEDURE — 86850 RBC ANTIBODY SCREEN: CPT | Performed by: OBSTETRICS & GYNECOLOGY

## 2025-03-16 PROCEDURE — 80307 DRUG TEST PRSMV CHEM ANLYZR: CPT | Performed by: OBSTETRICS & GYNECOLOGY

## 2025-03-16 PROCEDURE — 86901 BLOOD TYPING SEROLOGIC RH(D): CPT | Performed by: OBSTETRICS & GYNECOLOGY

## 2025-03-16 PROCEDURE — 86900 BLOOD TYPING SEROLOGIC ABO: CPT | Performed by: OBSTETRICS & GYNECOLOGY

## 2025-03-16 PROCEDURE — 25810000003 LACTATED RINGERS PER 1000 ML: Performed by: OBSTETRICS & GYNECOLOGY

## 2025-03-16 PROCEDURE — 86780 TREPONEMA PALLIDUM: CPT | Performed by: OBSTETRICS & GYNECOLOGY

## 2025-03-16 PROCEDURE — 86870 RBC ANTIBODY IDENTIFICATION: CPT | Performed by: OBSTETRICS & GYNECOLOGY

## 2025-03-16 RX ORDER — BUTORPHANOL TARTRATE 2 MG/ML
1 INJECTION, SOLUTION INTRAMUSCULAR; INTRAVENOUS
Status: DISCONTINUED | OUTPATIENT
Start: 2025-03-16 | End: 2025-03-18 | Stop reason: HOSPADM

## 2025-03-16 RX ORDER — SODIUM CHLORIDE, SODIUM LACTATE, POTASSIUM CHLORIDE, CALCIUM CHLORIDE 600; 310; 30; 20 MG/100ML; MG/100ML; MG/100ML; MG/100ML
125 INJECTION, SOLUTION INTRAVENOUS CONTINUOUS
Status: DISPENSED | OUTPATIENT
Start: 2025-03-16 | End: 2025-03-16

## 2025-03-16 RX ORDER — SODIUM CHLORIDE 0.9 % (FLUSH) 0.9 %
10 SYRINGE (ML) INJECTION EVERY 12 HOURS SCHEDULED
Status: DISCONTINUED | OUTPATIENT
Start: 2025-03-16 | End: 2025-03-18 | Stop reason: HOSPADM

## 2025-03-16 RX ORDER — CITRIC ACID/SODIUM CITRATE 334-500MG
30 SOLUTION, ORAL ORAL ONCE AS NEEDED
Status: DISCONTINUED | OUTPATIENT
Start: 2025-03-16 | End: 2025-03-18 | Stop reason: HOSPADM

## 2025-03-16 RX ORDER — SODIUM CHLORIDE 0.9 % (FLUSH) 0.9 %
10 SYRINGE (ML) INJECTION AS NEEDED
Status: DISCONTINUED | OUTPATIENT
Start: 2025-03-16 | End: 2025-03-18 | Stop reason: HOSPADM

## 2025-03-16 RX ORDER — ACETAMINOPHEN 325 MG/1
650 TABLET ORAL EVERY 4 HOURS PRN
Status: DISCONTINUED | OUTPATIENT
Start: 2025-03-16 | End: 2025-03-18 | Stop reason: HOSPADM

## 2025-03-16 RX ORDER — ONDANSETRON 2 MG/ML
4 INJECTION INTRAMUSCULAR; INTRAVENOUS EVERY 6 HOURS PRN
Status: DISCONTINUED | OUTPATIENT
Start: 2025-03-16 | End: 2025-03-18 | Stop reason: HOSPADM

## 2025-03-16 RX ORDER — TERBUTALINE SULFATE 1 MG/ML
0.25 INJECTION SUBCUTANEOUS AS NEEDED
Status: DISCONTINUED | OUTPATIENT
Start: 2025-03-16 | End: 2025-03-18 | Stop reason: HOSPADM

## 2025-03-16 RX ORDER — ONDANSETRON 4 MG/1
4 TABLET, ORALLY DISINTEGRATING ORAL EVERY 6 HOURS PRN
Status: DISCONTINUED | OUTPATIENT
Start: 2025-03-16 | End: 2025-03-18 | Stop reason: HOSPADM

## 2025-03-16 RX ORDER — VANCOMYCIN 1.75 GRAM/500 ML IN 0.9 % SODIUM CHLORIDE INTRAVENOUS
20 EVERY 12 HOURS
Status: DISCONTINUED | OUTPATIENT
Start: 2025-03-16 | End: 2025-03-16

## 2025-03-16 RX ORDER — OXYTOCIN/0.9 % SODIUM CHLORIDE 30/500 ML
2-20 PLASTIC BAG, INJECTION (ML) INTRAVENOUS
Status: DISCONTINUED | OUTPATIENT
Start: 2025-03-16 | End: 2025-03-16

## 2025-03-16 RX ORDER — BUTORPHANOL TARTRATE 2 MG/ML
2 INJECTION, SOLUTION INTRAMUSCULAR; INTRAVENOUS
Status: DISCONTINUED | OUTPATIENT
Start: 2025-03-16 | End: 2025-03-18 | Stop reason: HOSPADM

## 2025-03-16 RX ORDER — MISOPROSTOL 100 MCG
25 TABLET ORAL EVERY 4 HOURS
Status: COMPLETED | OUTPATIENT
Start: 2025-03-16 | End: 2025-03-17

## 2025-03-16 RX ORDER — SODIUM CHLORIDE 9 MG/ML
40 INJECTION, SOLUTION INTRAVENOUS AS NEEDED
Status: DISCONTINUED | OUTPATIENT
Start: 2025-03-16 | End: 2025-03-18 | Stop reason: HOSPADM

## 2025-03-16 RX ADMIN — Medication 25 MCG: at 20:48

## 2025-03-16 RX ADMIN — SODIUM CHLORIDE, SODIUM LACTATE, POTASSIUM CHLORIDE, CALCIUM CHLORIDE 125 ML/HR: 20; 30; 600; 310 INJECTION, SOLUTION INTRAVENOUS at 19:57

## 2025-03-17 ENCOUNTER — ANESTHESIA EVENT (OUTPATIENT)
Dept: LABOR AND DELIVERY | Facility: HOSPITAL | Age: 35
End: 2025-03-17
Payer: COMMERCIAL

## 2025-03-17 ENCOUNTER — ANESTHESIA (OUTPATIENT)
Dept: LABOR AND DELIVERY | Facility: HOSPITAL | Age: 35
End: 2025-03-17
Payer: COMMERCIAL

## 2025-03-17 LAB — TREPONEMA PALLIDUM IGG+IGM AB [PRESENCE] IN SERUM OR PLASMA BY IMMUNOASSAY: NORMAL

## 2025-03-17 PROCEDURE — 88302 TISSUE EXAM BY PATHOLOGIST: CPT | Performed by: OBSTETRICS & GYNECOLOGY

## 2025-03-17 PROCEDURE — 25010000002 GENTAMICIN PER 80 MG: Performed by: OBSTETRICS & GYNECOLOGY

## 2025-03-17 PROCEDURE — 25010000002 CLINDAMYCIN 900 MG/50ML SOLUTION: Performed by: OBSTETRICS & GYNECOLOGY

## 2025-03-17 PROCEDURE — C1755 CATHETER, INTRASPINAL: HCPCS | Performed by: NURSE ANESTHETIST, CERTIFIED REGISTERED

## 2025-03-17 PROCEDURE — 10907ZC DRAINAGE OF AMNIOTIC FLUID, THERAPEUTIC FROM PRODUCTS OF CONCEPTION, VIA NATURAL OR ARTIFICIAL OPENING: ICD-10-PCS | Performed by: OBSTETRICS & GYNECOLOGY

## 2025-03-17 PROCEDURE — 0UB70ZZ EXCISION OF BILATERAL FALLOPIAN TUBES, OPEN APPROACH: ICD-10-PCS | Performed by: OBSTETRICS & GYNECOLOGY

## 2025-03-17 PROCEDURE — 25010000002 KETOROLAC TROMETHAMINE PER 15 MG: Performed by: NURSE ANESTHETIST, CERTIFIED REGISTERED

## 2025-03-17 PROCEDURE — 25010000002 ONDANSETRON PER 1 MG: Performed by: NURSE ANESTHETIST, CERTIFIED REGISTERED

## 2025-03-17 PROCEDURE — 25010000002 OXYTOCIN PER 10 UNITS: Performed by: NURSE ANESTHETIST, CERTIFIED REGISTERED

## 2025-03-17 PROCEDURE — 25810000003 LACTATED RINGERS SOLUTION: Performed by: OBSTETRICS & GYNECOLOGY

## 2025-03-17 PROCEDURE — 25010000002 DEXAMETHASONE PER 1 MG: Performed by: NURSE ANESTHETIST, CERTIFIED REGISTERED

## 2025-03-17 PROCEDURE — 3E033VJ INTRODUCTION OF OTHER HORMONE INTO PERIPHERAL VEIN, PERCUTANEOUS APPROACH: ICD-10-PCS | Performed by: OBSTETRICS & GYNECOLOGY

## 2025-03-17 PROCEDURE — 25010000002 METOCLOPRAMIDE PER 10 MG: Performed by: OBSTETRICS & GYNECOLOGY

## 2025-03-17 PROCEDURE — 25010000002 HYDROMORPHONE 1 MG/ML SOLUTION: Performed by: NURSE ANESTHETIST, CERTIFIED REGISTERED

## 2025-03-17 PROCEDURE — 25010000002 ROPIVACAINE PER 1 MG: Performed by: NURSE ANESTHETIST, CERTIFIED REGISTERED

## 2025-03-17 PROCEDURE — 25010000002 BUTORPHANOL PER 1 MG: Performed by: OBSTETRICS & GYNECOLOGY

## 2025-03-17 PROCEDURE — 25810000003 LACTATED RINGERS PER 1000 ML: Performed by: OBSTETRICS & GYNECOLOGY

## 2025-03-17 PROCEDURE — 25010000002 FENTANYL CITRATE (PF) 100 MCG/2ML SOLUTION: Performed by: NURSE ANESTHETIST, CERTIFIED REGISTERED

## 2025-03-17 PROCEDURE — 63710000001 DIPHENHYDRAMINE PER 50 MG: Performed by: OBSTETRICS & GYNECOLOGY

## 2025-03-17 PROCEDURE — 25010000002 PROPOFOL 10 MG/ML EMULSION: Performed by: NURSE ANESTHETIST, CERTIFIED REGISTERED

## 2025-03-17 PROCEDURE — 88307 TISSUE EXAM BY PATHOLOGIST: CPT | Performed by: OBSTETRICS & GYNECOLOGY

## 2025-03-17 PROCEDURE — 25010000002 ONDANSETRON PER 1 MG: Performed by: OBSTETRICS & GYNECOLOGY

## 2025-03-17 PROCEDURE — 25010000002 LIDOCAINE (CARDIAC): Performed by: NURSE ANESTHETIST, CERTIFIED REGISTERED

## 2025-03-17 PROCEDURE — 58611 LIGATE OVIDUCT(S) ADD-ON: CPT | Performed by: OBSTETRICS & GYNECOLOGY

## 2025-03-17 PROCEDURE — 51702 INSERT TEMP BLADDER CATH: CPT

## 2025-03-17 PROCEDURE — 59510 CESAREAN DELIVERY: CPT | Performed by: OBSTETRICS & GYNECOLOGY

## 2025-03-17 RX ORDER — KETOROLAC TROMETHAMINE 30 MG/ML
30 INJECTION, SOLUTION INTRAMUSCULAR; INTRAVENOUS ONCE
Status: DISCONTINUED | OUTPATIENT
Start: 2025-03-17 | End: 2025-03-18 | Stop reason: HOSPADM

## 2025-03-17 RX ORDER — ROPIVACAINE HYDROCHLORIDE 2 MG/ML
INJECTION, SOLUTION EPIDURAL; INFILTRATION; PERINEURAL AS NEEDED
Status: DISCONTINUED | OUTPATIENT
Start: 2025-03-17 | End: 2025-03-17 | Stop reason: SURG

## 2025-03-17 RX ORDER — OXYCODONE AND ACETAMINOPHEN 5; 325 MG/1; MG/1
1 TABLET ORAL EVERY 4 HOURS PRN
Status: DISCONTINUED | OUTPATIENT
Start: 2025-03-17 | End: 2025-03-18 | Stop reason: HOSPADM

## 2025-03-17 RX ORDER — DEXMEDETOMIDINE HYDROCHLORIDE 4 UG/ML
INJECTION, SOLUTION INTRAVENOUS AS NEEDED
Status: DISCONTINUED | OUTPATIENT
Start: 2025-03-17 | End: 2025-03-17 | Stop reason: SURG

## 2025-03-17 RX ORDER — ONDANSETRON 2 MG/ML
INJECTION INTRAMUSCULAR; INTRAVENOUS AS NEEDED
Status: DISCONTINUED | OUTPATIENT
Start: 2025-03-17 | End: 2025-03-17 | Stop reason: SURG

## 2025-03-17 RX ORDER — CLINDAMYCIN PHOSPHATE 900 MG/50ML
900 INJECTION, SOLUTION INTRAVENOUS ONCE
Status: COMPLETED | OUTPATIENT
Start: 2025-03-17 | End: 2025-03-17

## 2025-03-17 RX ORDER — FAMOTIDINE 10 MG/ML
20 INJECTION, SOLUTION INTRAVENOUS ONCE AS NEEDED
Status: COMPLETED | OUTPATIENT
Start: 2025-03-17 | End: 2025-03-17

## 2025-03-17 RX ORDER — METOCLOPRAMIDE HYDROCHLORIDE 5 MG/ML
10 INJECTION INTRAMUSCULAR; INTRAVENOUS ONCE AS NEEDED
Status: COMPLETED | OUTPATIENT
Start: 2025-03-17 | End: 2025-03-17

## 2025-03-17 RX ORDER — SODIUM CHLORIDE, SODIUM LACTATE, POTASSIUM CHLORIDE, CALCIUM CHLORIDE 600; 310; 30; 20 MG/100ML; MG/100ML; MG/100ML; MG/100ML
125 INJECTION, SOLUTION INTRAVENOUS CONTINUOUS
Status: DISCONTINUED | OUTPATIENT
Start: 2025-03-17 | End: 2025-03-18

## 2025-03-17 RX ORDER — DIPHENHYDRAMINE HCL 50 MG
50 CAPSULE ORAL ONCE
Status: COMPLETED | OUTPATIENT
Start: 2025-03-17 | End: 2025-03-17

## 2025-03-17 RX ORDER — OXYTOCIN/0.9 % SODIUM CHLORIDE 30/500 ML
999 PLASTIC BAG, INJECTION (ML) INTRAVENOUS ONCE
Status: DISCONTINUED | OUTPATIENT
Start: 2025-03-17 | End: 2025-03-18 | Stop reason: HOSPADM

## 2025-03-17 RX ORDER — ROPIVACAINE HYDROCHLORIDE 5 MG/ML
INJECTION, SOLUTION EPIDURAL; INFILTRATION; PERINEURAL AS NEEDED
Status: DISCONTINUED | OUTPATIENT
Start: 2025-03-17 | End: 2025-03-17 | Stop reason: SURG

## 2025-03-17 RX ORDER — EPHEDRINE SULFATE 50 MG/ML
10 INJECTION, SOLUTION INTRAVENOUS
Status: DISCONTINUED | OUTPATIENT
Start: 2025-03-17 | End: 2025-03-18 | Stop reason: HOSPADM

## 2025-03-17 RX ORDER — ONDANSETRON 2 MG/ML
4 INJECTION INTRAMUSCULAR; INTRAVENOUS ONCE AS NEEDED
Status: DISCONTINUED | OUTPATIENT
Start: 2025-03-17 | End: 2025-03-18 | Stop reason: HOSPADM

## 2025-03-17 RX ORDER — DEXAMETHASONE SODIUM PHOSPHATE 4 MG/ML
INJECTION, SOLUTION INTRA-ARTICULAR; INTRALESIONAL; INTRAMUSCULAR; INTRAVENOUS; SOFT TISSUE AS NEEDED
Status: DISCONTINUED | OUTPATIENT
Start: 2025-03-17 | End: 2025-03-17 | Stop reason: SURG

## 2025-03-17 RX ORDER — CITRIC ACID/SODIUM CITRATE 334-500MG
30 SOLUTION, ORAL ORAL ONCE
Status: COMPLETED | OUTPATIENT
Start: 2025-03-17 | End: 2025-03-17

## 2025-03-17 RX ORDER — KETOROLAC TROMETHAMINE 30 MG/ML
INJECTION, SOLUTION INTRAMUSCULAR; INTRAVENOUS AS NEEDED
Status: DISCONTINUED | OUTPATIENT
Start: 2025-03-17 | End: 2025-03-17 | Stop reason: SURG

## 2025-03-17 RX ORDER — OXYTOCIN 10 [USP'U]/ML
INJECTION, SOLUTION INTRAMUSCULAR; INTRAVENOUS AS NEEDED
Status: DISCONTINUED | OUTPATIENT
Start: 2025-03-17 | End: 2025-03-17 | Stop reason: SURG

## 2025-03-17 RX ORDER — LIDOCAINE HYDROCHLORIDE AND EPINEPHRINE 15; 5 MG/ML; UG/ML
INJECTION, SOLUTION EPIDURAL AS NEEDED
Status: DISCONTINUED | OUTPATIENT
Start: 2025-03-17 | End: 2025-03-17 | Stop reason: SURG

## 2025-03-17 RX ORDER — METHYLERGONOVINE MALEATE 0.2 MG/ML
200 INJECTION INTRAVENOUS AS NEEDED
Status: DISCONTINUED | OUTPATIENT
Start: 2025-03-17 | End: 2025-03-18 | Stop reason: HOSPADM

## 2025-03-17 RX ORDER — FAMOTIDINE 10 MG/ML
20 INJECTION, SOLUTION INTRAVENOUS ONCE AS NEEDED
Status: DISCONTINUED | OUTPATIENT
Start: 2025-03-17 | End: 2025-03-18 | Stop reason: HOSPADM

## 2025-03-17 RX ORDER — ONDANSETRON 4 MG/1
4 TABLET, ORALLY DISINTEGRATING ORAL EVERY 6 HOURS PRN
Status: DISCONTINUED | OUTPATIENT
Start: 2025-03-17 | End: 2025-03-18 | Stop reason: HOSPADM

## 2025-03-17 RX ORDER — FAMOTIDINE 20 MG/1
20 TABLET, FILM COATED ORAL ONCE AS NEEDED
Status: DISCONTINUED | OUTPATIENT
Start: 2025-03-17 | End: 2025-03-18 | Stop reason: HOSPADM

## 2025-03-17 RX ORDER — MISOPROSTOL 200 UG/1
800 TABLET ORAL AS NEEDED
Status: DISCONTINUED | OUTPATIENT
Start: 2025-03-17 | End: 2025-03-18 | Stop reason: HOSPADM

## 2025-03-17 RX ORDER — HYDROMORPHONE HYDROCHLORIDE 1 MG/ML
0.5 INJECTION, SOLUTION INTRAMUSCULAR; INTRAVENOUS; SUBCUTANEOUS
Status: DISCONTINUED | OUTPATIENT
Start: 2025-03-17 | End: 2025-03-18 | Stop reason: HOSPADM

## 2025-03-17 RX ORDER — ONDANSETRON 2 MG/ML
4 INJECTION INTRAMUSCULAR; INTRAVENOUS EVERY 6 HOURS PRN
Status: DISCONTINUED | OUTPATIENT
Start: 2025-03-17 | End: 2025-03-18 | Stop reason: HOSPADM

## 2025-03-17 RX ORDER — ACETAMINOPHEN 500 MG
1000 TABLET ORAL ONCE
Status: COMPLETED | OUTPATIENT
Start: 2025-03-17 | End: 2025-03-17

## 2025-03-17 RX ORDER — FAMOTIDINE 10 MG/ML
20 INJECTION, SOLUTION INTRAVENOUS ONCE AS NEEDED
OUTPATIENT
Start: 2025-03-17

## 2025-03-17 RX ORDER — LIDOCAINE HYDROCHLORIDE 20 MG/ML
INJECTION, SOLUTION EPIDURAL; INFILTRATION; INTRACAUDAL; PERINEURAL AS NEEDED
Status: DISCONTINUED | OUTPATIENT
Start: 2025-03-17 | End: 2025-03-17 | Stop reason: SURG

## 2025-03-17 RX ORDER — OXYTOCIN/0.9 % SODIUM CHLORIDE 30/500 ML
2-20 PLASTIC BAG, INJECTION (ML) INTRAVENOUS
Status: DISCONTINUED | OUTPATIENT
Start: 2025-03-17 | End: 2025-03-18

## 2025-03-17 RX ORDER — OXYTOCIN/0.9 % SODIUM CHLORIDE 30/500 ML
250 PLASTIC BAG, INJECTION (ML) INTRAVENOUS CONTINUOUS
Status: ACTIVE | OUTPATIENT
Start: 2025-03-17 | End: 2025-03-17

## 2025-03-17 RX ORDER — PROPOFOL 10 MG/ML
VIAL (ML) INTRAVENOUS AS NEEDED
Status: DISCONTINUED | OUTPATIENT
Start: 2025-03-17 | End: 2025-03-17 | Stop reason: SURG

## 2025-03-17 RX ORDER — CARBOPROST TROMETHAMINE 250 UG/ML
250 INJECTION, SOLUTION INTRAMUSCULAR AS NEEDED
Status: DISCONTINUED | OUTPATIENT
Start: 2025-03-17 | End: 2025-03-18 | Stop reason: HOSPADM

## 2025-03-17 RX ORDER — FENTANYL CITRATE 50 UG/ML
INJECTION, SOLUTION INTRAMUSCULAR; INTRAVENOUS AS NEEDED
Status: DISCONTINUED | OUTPATIENT
Start: 2025-03-17 | End: 2025-03-17

## 2025-03-17 RX ORDER — CITRIC ACID/SODIUM CITRATE 334-500MG
15 SOLUTION, ORAL ORAL ONCE
Status: DISCONTINUED | OUTPATIENT
Start: 2025-03-17 | End: 2025-03-18 | Stop reason: HOSPADM

## 2025-03-17 RX ORDER — MISOPROSTOL 200 UG/1
800 TABLET ORAL AS NEEDED
Status: DISCONTINUED | OUTPATIENT
Start: 2025-03-17 | End: 2025-03-17

## 2025-03-17 RX ORDER — METOCLOPRAMIDE HYDROCHLORIDE 5 MG/ML
10 INJECTION INTRAMUSCULAR; INTRAVENOUS ONCE
Status: DISCONTINUED | OUTPATIENT
Start: 2025-03-17 | End: 2025-03-18 | Stop reason: HOSPADM

## 2025-03-17 RX ADMIN — SODIUM CHLORIDE, SODIUM LACTATE, POTASSIUM CHLORIDE, CALCIUM CHLORIDE 125 ML/HR: 20; 30; 600; 310 INJECTION, SOLUTION INTRAVENOUS at 16:26

## 2025-03-17 RX ADMIN — FENTANYL CITRATE 50 MCG: 50 INJECTION, SOLUTION INTRAMUSCULAR; INTRAVENOUS at 20:57

## 2025-03-17 RX ADMIN — LIDOCAINE HYDROCHLORIDE 7.5 ML: 20 INJECTION, SOLUTION INTRAVENOUS at 20:21

## 2025-03-17 RX ADMIN — ROPIVACAINE HYDROCHLORIDE 4 ML/HR: 2 INJECTION, SOLUTION EPIDURAL; INFILTRATION at 10:28

## 2025-03-17 RX ADMIN — GENTAMICIN SULFATE 300.4 MG: 40 INJECTION, SOLUTION INTRAMUSCULAR; INTRAVENOUS at 20:40

## 2025-03-17 RX ADMIN — ROPIVACAINE HYDROCHLORIDE 7.5 ML: 5 INJECTION EPIDURAL; INFILTRATION; PERINEURAL at 20:21

## 2025-03-17 RX ADMIN — DEXMEDETOMIDINE HYDROCHLORIDE IN 0.9% SODIUM CHLORIDE 20 MCG: 4 INJECTION INTRAVENOUS at 20:58

## 2025-03-17 RX ADMIN — DEXAMETHASONE SODIUM PHOSPHATE 8 MG: 4 INJECTION, SOLUTION INTRA-ARTICULAR; INTRALESIONAL; INTRAMUSCULAR; INTRAVENOUS; SOFT TISSUE at 21:01

## 2025-03-17 RX ADMIN — SODIUM CITRATE AND CITRIC ACID MONOHYDRATE 30 ML: 500; 334 SOLUTION ORAL at 20:21

## 2025-03-17 RX ADMIN — DEXMEDETOMIDINE HYDROCHLORIDE IN 0.9% SODIUM CHLORIDE 40 MCG: 4 INJECTION INTRAVENOUS at 10:28

## 2025-03-17 RX ADMIN — HYDROMORPHONE HYDROCHLORIDE 1 MG: 1 INJECTION, SOLUTION INTRAMUSCULAR; INTRAVENOUS; SUBCUTANEOUS at 21:00

## 2025-03-17 RX ADMIN — CLINDAMYCIN PHOSPHATE 900 MG: 900 INJECTION, SOLUTION INTRAVENOUS at 20:26

## 2025-03-17 RX ADMIN — Medication 2 MILLI-UNITS/MIN: at 06:00

## 2025-03-17 RX ADMIN — OXYTOCIN 20 UNITS: 10 INJECTION, SOLUTION INTRAMUSCULAR; INTRAVENOUS at 20:56

## 2025-03-17 RX ADMIN — KETOROLAC TROMETHAMINE 30 MG: 30 INJECTION, SOLUTION INTRAMUSCULAR; INTRAVENOUS at 21:18

## 2025-03-17 RX ADMIN — PROPOFOL 20 MG: 10 INJECTION, EMULSION INTRAVENOUS at 21:00

## 2025-03-17 RX ADMIN — METOCLOPRAMIDE HYDROCHLORIDE 10 MG: 5 INJECTION INTRAMUSCULAR; INTRAVENOUS at 20:23

## 2025-03-17 RX ADMIN — Medication 25 MCG: at 01:00

## 2025-03-17 RX ADMIN — ACETAMINOPHEN 1000 MG: 500 TABLET, FILM COATED ORAL at 20:21

## 2025-03-17 RX ADMIN — PROPOFOL 30 MG: 10 INJECTION, EMULSION INTRAVENOUS at 21:04

## 2025-03-17 RX ADMIN — SODIUM CHLORIDE, SODIUM LACTATE, POTASSIUM CHLORIDE, CALCIUM CHLORIDE 125 ML/HR: 20; 30; 600; 310 INJECTION, SOLUTION INTRAVENOUS at 10:06

## 2025-03-17 RX ADMIN — SODIUM CHLORIDE, SODIUM LACTATE, POTASSIUM CHLORIDE, CALCIUM CHLORIDE: 20; 30; 600; 310 INJECTION, SOLUTION INTRAVENOUS at 20:56

## 2025-03-17 RX ADMIN — DIPHENHYDRAMINE HYDROCHLORIDE 50 MG: 50 CAPSULE ORAL at 19:32

## 2025-03-17 RX ADMIN — LIDOCAINE HYDROCHLORIDE AND EPINEPHRINE 3 ML: 15; 5 INJECTION, SOLUTION EPIDURAL at 10:13

## 2025-03-17 RX ADMIN — FENTANYL CITRATE 50 MCG: 50 INJECTION, SOLUTION INTRAMUSCULAR; INTRAVENOUS at 21:00

## 2025-03-17 RX ADMIN — BUTORPHANOL TARTRATE 1 MG: 2 INJECTION, SOLUTION INTRAMUSCULAR; INTRAVENOUS at 07:51

## 2025-03-17 RX ADMIN — LIDOCAINE HYDROCHLORIDE 2.5 ML: 20 INJECTION, SOLUTION INTRAVENOUS at 20:37

## 2025-03-17 RX ADMIN — FAMOTIDINE 20 MG: 10 INJECTION INTRAVENOUS at 20:23

## 2025-03-17 RX ADMIN — ONDANSETRON 4 MG: 2 INJECTION INTRAMUSCULAR; INTRAVENOUS at 02:26

## 2025-03-17 RX ADMIN — MISOPROSTOL 800 MCG: 200 TABLET ORAL at 21:32

## 2025-03-17 RX ADMIN — SODIUM CHLORIDE, SODIUM LACTATE, POTASSIUM CHLORIDE, CALCIUM CHLORIDE 1000 ML: 20; 30; 600; 310 INJECTION, SOLUTION INTRAVENOUS at 09:33

## 2025-03-17 RX ADMIN — ROPIVACAINE HYDROCHLORIDE 10 ML: 2 INJECTION, SOLUTION EPIDURAL; INFILTRATION at 10:15

## 2025-03-17 RX ADMIN — ACETAMINOPHEN 650 MG: 325 TABLET, FILM COATED ORAL at 05:23

## 2025-03-17 RX ADMIN — ROPIVACAINE HYDROCHLORIDE 2.5 ML: 5 INJECTION EPIDURAL; INFILTRATION; PERINEURAL at 20:37

## 2025-03-17 RX ADMIN — DEXMEDETOMIDINE HYDROCHLORIDE IN 0.9% SODIUM CHLORIDE 20 MCG: 4 INJECTION INTRAVENOUS at 10:15

## 2025-03-17 RX ADMIN — LIDOCAINE HYDROCHLORIDE 5 ML: 20 INJECTION, SOLUTION INTRAVENOUS at 20:57

## 2025-03-17 RX ADMIN — ONDANSETRON 4 MG: 2 INJECTION INTRAMUSCULAR; INTRAVENOUS at 21:03

## 2025-03-17 RX ADMIN — Medication 250 ML/HR: at 21:47

## 2025-03-17 NOTE — ANESTHESIA PROCEDURE NOTES
Labor Epidural      Patient location during procedure: OB  Indication:at surgeon's request  Performed By  CRNA/CONOR: Hao Ordaz CRNA  Preanesthetic Checklist  Completed: patient identified, IV checked, site marked, risks and benefits discussed, surgical consent, monitors and equipment checked, pre-op evaluation and timeout performed  Prep:  Pt Position:sitting  Sterile Tech:gloves and sterile barrier  Prep:DuraPrep  Monitoring:blood pressure monitoring and continuous pulse oximetry  Epidural Block Procedure:  Approach:midline  Guidance:landmark technique and palpation technique  Location:L3-L4  Needle Type:Tuohy  Needle Gauge:17 G  Loss of Resistance Medium: saline  Cath Depth at skin:9 cm  Paresthesia: none, left and transient  Aspiration:negative  Test Dose:negative  Number of Attempts: 1  Post Assessment:  Dressing:occlusive dressing applied and secured with tape  Pt Tolerance:patient tolerated the procedure well with no apparent complications  Complications:no            
Yes

## 2025-03-17 NOTE — PROGRESS NOTES
"Pharmacy Dosing Service  Pharmacokinetics  Vancomycin Initial Evaluation  Assessment/Action/Plan:  Loading dose?: No  Current Order: Vancomycin 1750 mg IVPB every 12 hours  /Current end date:3/17/25  Levels: No  Additional antimicrobial agent(s): No  MRSA Nasal PCR ordered: No    Vancomycin dosage initiated based on population pharmacokinetic parameters. Pharmacy will continue to follow daily and adjust dose accordingly.     Subjective:  Wanda Rivers is a 34 y.o. female with a Vancomycin \"Pharmacy to Dose\" consult for the treatment of GBS (+) in pregnant mother , day 1 of 2 of treatment.    AUC Model Data:  Loading dose: N/A  Regimen: 1750 mg IV every 12 hours.  Start time: 20:26 on 03/16/2025  Exposure target: AUC24 (range)400-600 mg/L.hr   AUC24,ss: 643 mg/L.hr  Probability of AUC24 > 400: 90 %  Ctrough,ss: 18.7 mg/L  Probability of Ctrough,ss > 20: 45 %  Probability of nephrotoxicity (Lodise MICHAELLE 2009): 15 %    X2 doses: est. LVF=681 after 2nd dose.    Objective:  Ht:  ; Wt:    Estimated Creatinine Clearance: 144.9 mL/min (A) (by C-G formula based on SCr of 0.51 mg/dL (L)).   Creatinine   Date Value Ref Range Status   03/08/2025 0.51 (L) 0.57 - 1.00 mg/dL Final   03/07/2025 0.54 (L) 0.57 - 1.00 mg/dL Final   02/28/2025 0.46 (L) 0.57 - 1.00 mg/dL Final   02/07/2025 0.33 (L) 0.57 - 1.00 mg/dL Final      Lab Results   Component Value Date    WBC 9.80 03/08/2025    WBC 7.45 03/07/2025    WBC 14.4 (H) 02/28/2025      Baseline culture results:  Microbiology Results (last 10 days)       Procedure Component Value - Date/Time    Chlamydia trachomatis, Neisseria gonorrhoeae, PCR w/ confirmation - Swab, Cervix [[id number]] Collected: 03/11/25 0907    Lab Status: Final result Specimen: Swab from Cervix Updated: 03/15/25 2012     Chlamydia trachomatis, ANILA Negative     Neisseria gonorrhoeae, ANILA Negative    Narrative:      Performed at:  01 - [facility]  [address], [place], [place]  [id number]  Lab " Director: Canelo Keane MD, Phone:  [phone]  Patient Fasting:  N     Strep Grp B ANILA + Reflex - , Vaginal/Rectum [[id number]] Collected: 25 0907    Lab Status: Final result Specimen: Vaginal/Rectum Updated: 03/15/25 2012     Strep Gp B ANILA Negative     Comment: Centers for Disease Control and Prevention (CDC) and American Congress  of Obstetricians and Gynecologists (ACOG) guidelines for prevention of   group B streptococcal (GBS) disease specify co-collection of  a vaginal and rectal swab specimen to maximize sensitivity of GBS  detection. Per the CDC and ACOG, swabbing both the lower vagina and  rectum substantially increases the yield of detection compared with  sampling the vagina alone.  Penicillin G, ampicillin, or cefazolin are indicated for intrapartum  prophylaxis of  GBS colonization. Reflex susceptibility  testing should be performed prior to use of clindamycin only on GBS  isolates from penicillin-allergic women who are considered a high risk  for anaphylaxis. Treatment with vancomycin without additional testing  is warranted if resistance to clindamycin is noted.         Narrative:      Performed at:  [address] [place] [address]  [id number]  : New Puckett PhD, Phone:  [phone]  Patient Fasting:  N     Urine Culture - Urine, Urine, Clean Catch [[id number]]  (Normal) Collected: 25 1124    Lab Status: Final result Specimen: Urine, Clean Catch Updated: 25 1102     Urine Culture No growth            Andi Price RPH  25 19:28 CDT

## 2025-03-17 NOTE — PLAN OF CARE
Goal Outcome Evaluation:  Plan of Care Reviewed With: patient        Progress: improving  Outcome Evaluation: PT admitted to LDR for IOL due to GHTN. , 37w1d, O-, GBS -. NKA. PT overnight recieved x2 doses of cyotec 25mcg orally, pitocin started @0600 at 2. Last cervical exam -/-3 @0501.

## 2025-03-17 NOTE — ANESTHESIA PREPROCEDURE EVALUATION
Anesthesia Evaluation     Patient summary reviewed   NPO Solid Status: > 8 hours  NPO Liquid Status: > 8 hours           Airway   Mallampati: I  TM distance: >3 FB  Neck ROM: full  No difficulty expected  Dental    (+) poor dentition    Comment: Claims all teeth are loose    Pulmonary - normal exam   (+) a smoker Current, Smoked day of surgery, cigarettes,  Cardiovascular - normal exam    (+) hypertension well controlled      Neuro/Psych  (+) headaches, psychiatric history Anxiety and Depression  GI/Hepatic/Renal/Endo    (+) GERD well controlled    Musculoskeletal     Abdominal  - normal exam   Substance History   (+) drug use     OB/GYN    (+) Pregnant, pregnancy induced hypertension        Other                    Anesthesia Plan    ASA 2     epidural       Anesthetic plan, risks, benefits, and alternatives have been provided, discussed and informed consent has been obtained with: patient and spouse/significant other.    CODE STATUS:    Code Status (Patient has no pulse and is not breathing): CPR (Attempt to Resuscitate)  Medical Interventions (Patient has pulse or is breathing): Full Support

## 2025-03-17 NOTE — INTERVAL H&P NOTE
H&P updated. The patient was examined and now 1-/-3. Pitocin per protocol. NST reactive and reassuring. Contractions irregular every 2-3 minutes.  AROM with clear fluid. Continue pitocin per protocol. Blood pressures non-severe. Plan for .

## 2025-03-18 LAB
ABO GROUP BLD: NORMAL
BASOPHILS # BLD AUTO: 0.14 10*3/MM3 (ref 0–0.2)
BASOPHILS NFR BLD AUTO: 0.5 % (ref 0–1.5)
BLD GP AB SCN SERPL QL: NEGATIVE
DEPRECATED RDW RBC AUTO: 44 FL (ref 37–54)
EOSINOPHIL # BLD AUTO: 0.01 10*3/MM3 (ref 0–0.4)
EOSINOPHIL NFR BLD AUTO: 0 % (ref 0.3–6.2)
ERYTHROCYTE [DISTWIDTH] IN BLOOD BY AUTOMATED COUNT: 12.6 % (ref 12.3–15.4)
FETAL BLEED: NEGATIVE
HCT VFR BLD AUTO: 30.6 % (ref 34–46.6)
HGB BLD-MCNC: 10.3 G/DL (ref 12–15.9)
IMM GRANULOCYTES # BLD AUTO: 0.39 10*3/MM3 (ref 0–0.05)
IMM GRANULOCYTES NFR BLD AUTO: 1.3 % (ref 0–0.5)
LYMPHOCYTES # BLD AUTO: 1.77 10*3/MM3 (ref 0.7–3.1)
LYMPHOCYTES NFR BLD AUTO: 6.1 % (ref 19.6–45.3)
MCH RBC QN AUTO: 32.6 PG (ref 26.6–33)
MCHC RBC AUTO-ENTMCNC: 33.7 G/DL (ref 31.5–35.7)
MCV RBC AUTO: 96.8 FL (ref 79–97)
MONOCYTES # BLD AUTO: 1.6 10*3/MM3 (ref 0.1–0.9)
MONOCYTES NFR BLD AUTO: 5.5 % (ref 5–12)
NEUTROPHILS NFR BLD AUTO: 25.07 10*3/MM3 (ref 1.7–7)
NEUTROPHILS NFR BLD AUTO: 86.6 % (ref 42.7–76)
NRBC BLD AUTO-RTO: 0 /100 WBC (ref 0–0.2)
NUMBER OF DOSES: NORMAL
PLATELET # BLD AUTO: 328 10*3/MM3 (ref 140–450)
PMV BLD AUTO: 10.8 FL (ref 6–12)
RBC # BLD AUTO: 3.16 10*6/MM3 (ref 3.77–5.28)
RH BLD: NEGATIVE
WBC NRBC COR # BLD AUTO: 28.98 10*3/MM3 (ref 3.4–10.8)

## 2025-03-18 PROCEDURE — 25010000002 ENOXAPARIN PER 10 MG: Performed by: OBSTETRICS & GYNECOLOGY

## 2025-03-18 PROCEDURE — 0503F POSTPARTUM CARE VISIT: CPT | Performed by: ADVANCED PRACTICE MIDWIFE

## 2025-03-18 PROCEDURE — 86901 BLOOD TYPING SEROLOGIC RH(D): CPT | Performed by: OBSTETRICS & GYNECOLOGY

## 2025-03-18 PROCEDURE — 85025 COMPLETE CBC W/AUTO DIFF WBC: CPT | Performed by: OBSTETRICS & GYNECOLOGY

## 2025-03-18 PROCEDURE — 25010000002 KETOROLAC TROMETHAMINE PER 15 MG: Performed by: OBSTETRICS & GYNECOLOGY

## 2025-03-18 PROCEDURE — 86850 RBC ANTIBODY SCREEN: CPT

## 2025-03-18 PROCEDURE — 25010000002 RHO D IMMUNE GLOBULIN 1500 UNIT/2ML SOLUTION PREFILLED SYRINGE: Performed by: OBSTETRICS & GYNECOLOGY

## 2025-03-18 PROCEDURE — 86900 BLOOD TYPING SEROLOGIC ABO: CPT | Performed by: OBSTETRICS & GYNECOLOGY

## 2025-03-18 PROCEDURE — 85461 HEMOGLOBIN FETAL: CPT | Performed by: OBSTETRICS & GYNECOLOGY

## 2025-03-18 RX ORDER — ACETAMINOPHEN 325 MG/1
650 TABLET ORAL EVERY 6 HOURS
Status: DISCONTINUED | OUTPATIENT
Start: 2025-03-19 | End: 2025-03-19 | Stop reason: HOSPADM

## 2025-03-18 RX ORDER — OXYCODONE HYDROCHLORIDE 5 MG/1
10 TABLET ORAL EVERY 4 HOURS PRN
Status: DISCONTINUED | OUTPATIENT
Start: 2025-03-18 | End: 2025-03-19 | Stop reason: HOSPADM

## 2025-03-18 RX ORDER — SIMETHICONE 80 MG
80 TABLET,CHEWABLE ORAL 4 TIMES DAILY PRN
Status: DISCONTINUED | OUTPATIENT
Start: 2025-03-18 | End: 2025-03-19 | Stop reason: HOSPADM

## 2025-03-18 RX ORDER — ACETAMINOPHEN 500 MG
1000 TABLET ORAL EVERY 6 HOURS
Status: COMPLETED | OUTPATIENT
Start: 2025-03-18 | End: 2025-03-18

## 2025-03-18 RX ORDER — ENOXAPARIN SODIUM 100 MG/ML
40 INJECTION SUBCUTANEOUS NIGHTLY
Status: DISCONTINUED | OUTPATIENT
Start: 2025-03-18 | End: 2025-03-19 | Stop reason: HOSPADM

## 2025-03-18 RX ORDER — METHYLERGONOVINE MALEATE 0.2 MG/ML
200 INJECTION INTRAVENOUS AS NEEDED
Status: DISCONTINUED | OUTPATIENT
Start: 2025-03-18 | End: 2025-03-19 | Stop reason: HOSPADM

## 2025-03-18 RX ORDER — PRENATAL VIT/IRON FUM/FOLIC AC 27MG-0.8MG
1 TABLET ORAL DAILY
Status: DISCONTINUED | OUTPATIENT
Start: 2025-03-18 | End: 2025-03-19 | Stop reason: HOSPADM

## 2025-03-18 RX ORDER — BISACODYL 10 MG
10 SUPPOSITORY, RECTAL RECTAL DAILY PRN
Status: DISCONTINUED | OUTPATIENT
Start: 2025-03-18 | End: 2025-03-19 | Stop reason: HOSPADM

## 2025-03-18 RX ORDER — NALBUPHINE HYDROCHLORIDE 10 MG/ML
2.5 INJECTION INTRAMUSCULAR; INTRAVENOUS; SUBCUTANEOUS EVERY 4 HOURS PRN
Status: ACTIVE | OUTPATIENT
Start: 2025-03-18 | End: 2025-03-19

## 2025-03-18 RX ORDER — MISOPROSTOL 200 UG/1
800 TABLET ORAL ONCE AS NEEDED
Status: DISCONTINUED | OUTPATIENT
Start: 2025-03-18 | End: 2025-03-19 | Stop reason: HOSPADM

## 2025-03-18 RX ORDER — ONDANSETRON 2 MG/ML
4 INJECTION INTRAMUSCULAR; INTRAVENOUS EVERY 6 HOURS PRN
Status: DISCONTINUED | OUTPATIENT
Start: 2025-03-18 | End: 2025-03-19 | Stop reason: HOSPADM

## 2025-03-18 RX ORDER — NALOXONE HCL 0.4 MG/ML
0.4 VIAL (ML) INJECTION
Status: ACTIVE | OUTPATIENT
Start: 2025-03-18 | End: 2025-03-19

## 2025-03-18 RX ORDER — OXYTOCIN/0.9 % SODIUM CHLORIDE 30/500 ML
125 PLASTIC BAG, INJECTION (ML) INTRAVENOUS ONCE AS NEEDED
Status: DISCONTINUED | OUTPATIENT
Start: 2025-03-18 | End: 2025-03-19 | Stop reason: HOSPADM

## 2025-03-18 RX ORDER — IBUPROFEN 600 MG/1
600 TABLET, FILM COATED ORAL EVERY 6 HOURS
Status: DISCONTINUED | OUTPATIENT
Start: 2025-03-19 | End: 2025-03-19 | Stop reason: HOSPADM

## 2025-03-18 RX ORDER — HYDROXYZINE HYDROCHLORIDE 25 MG/1
50 TABLET, FILM COATED ORAL EVERY 6 HOURS PRN
Status: DISCONTINUED | OUTPATIENT
Start: 2025-03-18 | End: 2025-03-19 | Stop reason: HOSPADM

## 2025-03-18 RX ORDER — METOCLOPRAMIDE HYDROCHLORIDE 5 MG/ML
10 INJECTION INTRAMUSCULAR; INTRAVENOUS EVERY 4 HOURS PRN
Status: ACTIVE | OUTPATIENT
Start: 2025-03-18 | End: 2025-03-19

## 2025-03-18 RX ORDER — ONDANSETRON 4 MG/1
4 TABLET, ORALLY DISINTEGRATING ORAL EVERY 8 HOURS PRN
Status: DISCONTINUED | OUTPATIENT
Start: 2025-03-18 | End: 2025-03-19 | Stop reason: HOSPADM

## 2025-03-18 RX ORDER — CARBOPROST TROMETHAMINE 250 UG/ML
250 INJECTION, SOLUTION INTRAMUSCULAR ONCE AS NEEDED
Status: DISCONTINUED | OUTPATIENT
Start: 2025-03-18 | End: 2025-03-19 | Stop reason: HOSPADM

## 2025-03-18 RX ORDER — HYDROMORPHONE HYDROCHLORIDE 1 MG/ML
0.5 INJECTION, SOLUTION INTRAMUSCULAR; INTRAVENOUS; SUBCUTANEOUS
Status: DISCONTINUED | OUTPATIENT
Start: 2025-03-18 | End: 2025-03-19 | Stop reason: HOSPADM

## 2025-03-18 RX ORDER — KETOROLAC TROMETHAMINE 30 MG/ML
30 INJECTION, SOLUTION INTRAMUSCULAR; INTRAVENOUS EVERY 6 HOURS
Status: COMPLETED | OUTPATIENT
Start: 2025-03-18 | End: 2025-03-18

## 2025-03-18 RX ORDER — NALOXONE HCL 0.4 MG/ML
0.1 VIAL (ML) INJECTION
Status: DISCONTINUED | OUTPATIENT
Start: 2025-03-18 | End: 2025-03-19 | Stop reason: HOSPADM

## 2025-03-18 RX ORDER — DOCUSATE SODIUM 100 MG/1
100 CAPSULE, LIQUID FILLED ORAL 2 TIMES DAILY PRN
Status: DISCONTINUED | OUTPATIENT
Start: 2025-03-18 | End: 2025-03-19 | Stop reason: HOSPADM

## 2025-03-18 RX ORDER — BISACODYL 5 MG/1
10 TABLET, DELAYED RELEASE ORAL DAILY PRN
Status: DISCONTINUED | OUTPATIENT
Start: 2025-03-18 | End: 2025-03-19 | Stop reason: HOSPADM

## 2025-03-18 RX ORDER — OXYCODONE HYDROCHLORIDE 5 MG/1
5 TABLET ORAL EVERY 4 HOURS PRN
Status: DISCONTINUED | OUTPATIENT
Start: 2025-03-18 | End: 2025-03-19 | Stop reason: HOSPADM

## 2025-03-18 RX ADMIN — ACETAMINOPHEN 1000 MG: 500 TABLET, FILM COATED ORAL at 14:13

## 2025-03-18 RX ADMIN — ACETAMINOPHEN 1000 MG: 500 TABLET, FILM COATED ORAL at 20:45

## 2025-03-18 RX ADMIN — ENOXAPARIN SODIUM 40 MG: 100 INJECTION SUBCUTANEOUS at 20:45

## 2025-03-18 RX ADMIN — ACETAMINOPHEN 1000 MG: 500 TABLET, FILM COATED ORAL at 08:31

## 2025-03-18 RX ADMIN — KETOROLAC TROMETHAMINE 30 MG: 30 INJECTION, SOLUTION INTRAMUSCULAR; INTRAVENOUS at 22:34

## 2025-03-18 RX ADMIN — KETOROLAC TROMETHAMINE 30 MG: 30 INJECTION, SOLUTION INTRAMUSCULAR; INTRAVENOUS at 11:15

## 2025-03-18 RX ADMIN — HUMAN RHO(D) IMMUNE GLOBULIN 1500 UNITS: 1500 SOLUTION INTRAMUSCULAR; INTRAVENOUS at 17:05

## 2025-03-18 RX ADMIN — ACETAMINOPHEN 1000 MG: 500 TABLET, FILM COATED ORAL at 02:33

## 2025-03-18 RX ADMIN — PRENATAL VIT W/ FE FUMARATE-FA TAB 27-0.8 MG 1 TABLET: 27-0.8 TAB at 08:31

## 2025-03-18 RX ADMIN — KETOROLAC TROMETHAMINE 30 MG: 30 INJECTION, SOLUTION INTRAMUSCULAR; INTRAVENOUS at 17:05

## 2025-03-18 RX ADMIN — KETOROLAC TROMETHAMINE 30 MG: 30 INJECTION, SOLUTION INTRAMUSCULAR; INTRAVENOUS at 05:04

## 2025-03-18 NOTE — PAYOR COMM NOTE
"ADMIT INPT 3/16/25  UR  [id number]    Javed Rivers (34 y.o. Female)       Date of Birth   1990    Social Security Number       Address   [place] [facility] [address]    Home Phone   [phone]    MRN   [id number]       [employer]ism   Patient Refused    Marital Status   Significant Other                            Admission Date   3/16/2025    Admission Type   Elective    Admitting Provider   Geovanna Cole MD    Attending Provider   Geovanna Cole MD    Department, Room/Bed   [facility] MOTHER BABY 2A, M264/1       Discharge Date       Discharge Disposition       Discharge Destination                                 Attending Provider: Geovanna Cole MD    Allergies: Penicillins    Isolation: None   Infection: None   Code Status: CPR    Ht: 152.4 cm (60\")   Wt: 82.1 kg (181 lb)    Admission Cmt: None   Principal Problem: Gestational hypertension without significant proteinuria in third trimester [O13.3]                   Active Insurance as of 3/16/2025       Primary Coverage       Payor Plan Insurance Group Employer/Plan Group    [facility] [id number]       Payor Plan Address Payor Plan Phone Number Payor Plan Fax Number Effective Dates    [address] [address] [phone]  1/1/2024 - None Entered    [facility] [id number]         Subscriber Name Subscriber Birth Date Member ID       JAVED RIVERS 1990 [id number]               Secondary Coverage       Payor Plan Insurance Group Employer/Plan Group    [employer] [place] [employer] [id number]       Payor Plan Address Payor Plan Phone Number Payor Plan Fax Number Effective Dates    [address] [address] [phone]  7/12/2021 - None Entered    [facility] [id number]         Subscriber Name Subscriber Birth Date Member ID       JAVED RIVERS 1990 [id number]                     Emergency Contacts        (Rel.) Home Phone Work Phone Mobile Phone    IBAN CATHERINE (Significant Other) -- -- " [phone]                 History & Physical        Jasmeet Moya MD at 25 0797          H&P updated. The patient was examined and now 1-/-3. Pitocin per protocol. NST reactive and reassuring. Contractions irregular every 2-3 minutes.  AROM with clear fluid. Continue pitocin per protocol. Blood pressures non-severe. Plan for .     Electronically signed by Jasmeet Moya MD at 25 0734   Source Note: H&P (View-Only)          [facility]  Wanda Rivers  : 1990  MRN: [id number]  CSN: [id number]    History and Physical    Subjective  Wanda Rivers is a 34 y.o. year old  with an Estimated Date of Delivery: 25 scheduled for induction of labor on 3/17/2025 due to  gestational HTN, poorly controlled. Pt admitted 3/7 with elevated BP. 24 hour urine normal however pt with elevated uric acid and LDH.  Pt reports HA that has resolved.  Received steroids for FLM .  Prenatal care has been with Dr. Geovanna Cole.  It has been complicated by gestational hypertension.    OB History    Para Term  AB Living   3 0 0 0 2 0   SAB IAB Ectopic Molar Multiple Live Births   2 0 0 0 0 0      # Outcome Date GA Lbr See/2nd Weight Sex Type Anes PTL Lv   3 Current            2 SAB 24 5w0d          1 SAB 23 7w0d     Gen       Past Medical History:   Diagnosis Date    Anxiety     Depression     GERD (gastroesophageal reflux disease)     Hypertension      Past Surgical History:   Procedure Laterality Date    D & C WITH SUCTION N/A 2023    Procedure: DILATATION AND CURETTAGE WITH SUCTION;  Surgeon: Geovanna Cole MD;  Location: [facility];  Service: Obstetrics/Gynecology;  Laterality: N/A;    ENDOSCOPY      EPIGASTRIC HERNIA REPAIR      WISDOM TOOTH EXTRACTION         Current Outpatient Medications:     acetaminophen (TYLENOL) 500 MG tablet, Take 2 tablets by mouth Every 6 (Six) Hours As Needed for Mild Pain., Disp: , Rfl:     cetirizine (zyrTEC) 5 MG  tablet, Take 1 tablet by mouth Daily., Disp: , Rfl:     cyclobenzaprine (FLEXERIL) 10 MG tablet, Take 1 tablet by mouth Every 8 (Eight) Hours As Needed for Muscle Spasms., Disp: 30 tablet, Rfl: 1    EQ Cough DM 30 MG/5ML Suspension Extended Release oral suspension, Take 5 mL by mouth Every 12 (Twelve) Hours., Disp: , Rfl:     Misc. Devices (Breast Pump) misc, Use 1 Device As Needed (for breastfeeding)., Disp: 1 each, Rfl: 0    Prenatal MV-Min-Fe Fum-FA-DHA (PRENATAL 1 PO), Take 1 tablet/day by mouth Daily., Disp: , Rfl:     Allergies   Allergen Reactions    Penicillins Hives     Social History    Tobacco Use      Smoking status: Every Day        Packs/day: 0.50        Years: 0.5 packs/day for 22.2 years (11.1 ttl pk-yrs)        Types: Cigarettes        Start date: 2003      Smokeless tobacco: Never    Review of Systems      Objective  LMP 06/24/2024   General: well developed; well nourished  no acute distress  mentation appropriate   Heart: regular rate and rhythm, S1, S2 normal, no murmur, click, rub or gallop   Lungs: breathing is unlabored   Abdomen:  Cervix:  Presentation:  EFW by Leopold's:  EFW by recent u/s: soft, non-tender; no masses  no umbilical or inguinal hernias are present  no hepato-splenomegaly  was checked (by [place]): 1 cm / 50 % / -2  Vertex         Prenatal Labs  Lab Results   Component Value Date    HGB 12.7 03/08/2025    HEPBSAG Negative 08/23/2024    ABO O 08/23/2024    RH Negative 08/23/2024    ABSCRN Negative 01/14/2025    [id number] Non Reactive 08/23/2024    URINECX No growth 03/07/2025       Recent Labs  Lab Results   Component Value Date    HGB 12.7 03/08/2025    HCT 35.8 03/08/2025    WBC 9.80 03/08/2025     03/08/2025           Assessment  IUP with an Estimated Date of Delivery: 4/6/25  Induction of labor scheduled on 3/17/2025 at 37 weeks gestation due to gestational HTN, poorly controlled.  The patient's pelvis feels clinically adequate for IOL to be appropriate, although she  understands that this clinical judgement is not always accurate.    Group B Strep status: pending         Plan  Risks and benefits of induction discussed.  Patient understands that IOL increases the risk of  delivery over spontaneous labor, especially if the patient does not have a favorable cervix.  Cervix 1/50/soft per Dr. Allen. Will start with pitocin induction.  Anticipate .     Geovanna Cole MD  3/11/2025             Electronically signed by Geovanna Cole MD at 25 1336                 Geovanna Cole MD at 25 1329          [facility]  Wanda Rivers  : 1990  MRN: [id number]  CSN: [id number]    History and Physical    Subjective  Wanda Rivers is a 34 y.o. year old  with an Estimated Date of Delivery: 25 scheduled for induction of labor on 3/17/2025 due to  gestational HTN, poorly controlled. Pt admitted 3/7 with elevated BP. 24 hour urine normal however pt with elevated uric acid and LDH.  Pt reports HA that has resolved.  Received steroids for FLM .  Prenatal care has been with Dr. Geovanna Cole.  It has been complicated by gestational hypertension.    OB History    Para Term  AB Living   3 0 0 0 2 0   SAB IAB Ectopic Molar Multiple Live Births   2 0 0 0 0 0      # Outcome Date GA Lbr See/2nd Weight Sex Type Anes PTL Lv   3 Current            2 SAB 24 5w0d          1 SAB 23 7w0d     Gen       Past Medical History:   Diagnosis Date    Anxiety     Depression     GERD (gastroesophageal reflux disease)     Hypertension      Past Surgical History:   Procedure Laterality Date    D & C WITH SUCTION N/A 2023    Procedure: DILATATION AND CURETTAGE WITH SUCTION;  Surgeon: Geovanna Cole MD;  Location: [facility];  Service: Obstetrics/Gynecology;  Laterality: N/A;    ENDOSCOPY      EPIGASTRIC HERNIA REPAIR      WISDOM TOOTH EXTRACTION         Current Outpatient Medications:     acetaminophen (TYLENOL)  500 MG tablet, Take 2 tablets by mouth Every 6 (Six) Hours As Needed for Mild Pain., Disp: , Rfl:     cetirizine (zyrTEC) 5 MG tablet, Take 1 tablet by mouth Daily., Disp: , Rfl:     cyclobenzaprine (FLEXERIL) 10 MG tablet, Take 1 tablet by mouth Every 8 (Eight) Hours As Needed for Muscle Spasms., Disp: 30 tablet, Rfl: 1    EQ Cough DM 30 MG/5ML Suspension Extended Release oral suspension, Take 5 mL by mouth Every 12 (Twelve) Hours., Disp: , Rfl:     Misc. Devices (Breast Pump) misc, Use 1 Device As Needed (for breastfeeding)., Disp: 1 each, Rfl: 0    Prenatal MV-Min-Fe Fum-FA-DHA (PRENATAL 1 PO), Take 1 tablet/day by mouth Daily., Disp: , Rfl:     Allergies   Allergen Reactions    Penicillins Hives     Social History    Tobacco Use      Smoking status: Every Day        Packs/day: 0.50        Years: 0.5 packs/day for 22.2 years (11.1 ttl pk-yrs)        Types: Cigarettes        Start date: 2003      Smokeless tobacco: Never    Review of Systems      Objective  LMP 06/24/2024   General: well developed; well nourished  no acute distress  mentation appropriate   Heart: regular rate and rhythm, S1, S2 normal, no murmur, click, rub or gallop   Lungs: breathing is unlabored   Abdomen:  Cervix:  Presentation:  EFW by Leopold's:  EFW by recent u/s: soft, non-tender; no masses  no umbilical or inguinal hernias are present  no hepato-splenomegaly  was checked (by [place]): 1 cm / 50 % / -2  Vertex         Prenatal Labs  Lab Results   Component Value Date    HGB 12.7 03/08/2025    HEPBSAG Negative 08/23/2024    ABO O 08/23/2024    RH Negative 08/23/2024    ABSCRN Negative 01/14/2025    [id number] Non Reactive 08/23/2024    URINECX No growth 03/07/2025       Recent Labs  Lab Results   Component Value Date    HGB 12.7 03/08/2025    HCT 35.8 03/08/2025    WBC 9.80 03/08/2025     03/08/2025           Assessment  IUP with an Estimated Date of Delivery: 4/6/25  Induction of labor scheduled on 3/17/2025 at 37 weeks gestation due to  "gestational HTN, poorly controlled.  The patient's pelvis feels clinically adequate for IOL to be appropriate, although she understands that this clinical judgement is not always accurate.    Group B Strep status: pending         Plan  Risks and benefits of induction discussed.  Patient understands that IOL increases the risk of  delivery over spontaneous labor, especially if the patient does not have a favorable cervix.  Cervix 1/50/soft per Dr. Allen. Will start with pitocin induction.  Anticipate .     Geovanna Cole MD  3/11/2025             Electronically signed by Geovanna Cole MD at 25 1336          Operative/Procedure Notes (all)        Jasmeet Moya MD at 25  Version 1 of [address] [place] [address]   Section Operative Note    Pre-Operative Dx:   1.  IUP at Gestational Age: 37w1d weeks    2.  Failure to Progress   3.  Labor status: Induced Onset of Labor  Desires permanent sterilization  AMA  GBS negative     Postoperative dx:    1.  Same     Procedure: Procedure(s):   SECTION PRIMARY WITH SALPINGECTOMY   Surgeon/Assistant: Surgeon(s):  Jasmeet Moya MD         Anesthesia:  Anesthesiologist: Choice  CRNA: Meghan Sánchez CRNA; Hao Ordaz CRNA         QBL:  907 mL        IV Fluids: 1000 mls.   UOP: 400 mls. clear    I/O this shift:  In: 1100.2 [I.V.:1000; IV Piggyback:100.2]  Out: 907 [Blood:907]   Antibiotics: clindamycin (Cleocin) and gentamycin (Garamycin)     Infant:           Gender: female infant    Weight: No birth weight on file.    Apgars:   @ 1 minute /       @ 5 minutes    Cord gases: Venous:  No results found for: \"PHCVEN\", \"BECVEN\"     Arterial:  No results found for: \"PHCART\", \"BECART\"     Indication for C/Section:  Failure to Progress        Priority for C/Section:  routine     Procedure Details:   After informed consent was obtained, the patient was taken to the operating room where  Epidural anesthesia was confirmed. A time " out procedure was completed, confirming the correct patient and correct procedure. Perioperative antibiotics were administered. She was placed in the supine position with leftward tilt and her abdomen was prepped and draped in normal sterile fashion. Whatley catheter already in place.     After confirming adequate anesthesia, a Pfannenstiel incision was made in the skin with the surgical scalpel. Sharp dissection was carried out over the subsequent layers of tissue down to the fascia at midline. The fascial incision was extended laterally in both directions with fascial stretch. The rectus muscles were divided at midline and the peritoneum was then opened with blunt dissection at its upper margin. The peritoneum was then stretched by pulling caudally and cranially. All layers of the abdominal wall were manually stretched laterally to provide an opening as large as the skin incision.  An Alex self-retaining retractor was then inserted, taking care to avoid entrapping the bowel.     A bladder flap was created.  A low-transverse incision was made in the lower uterine segment using the scalpel. The uterine incision was extended bilaterally using blunt dissection in a cranial-caudad stretch. The amniotic sac already ruptured. Remaining fluid clear. The surgeon’s hand was placed into the uterine cavity. The fetal head was identified, elevated into the abdomen and delivered through the uterine incision with the assistance of fundal pressure. The infant was examined for nuchal cord. A fairly loose nuchal cord was identified and reduced without excessive tension..    The infant was delivered with traction and the assistance of fundal pressure. The infant’s oral and nasal passages were bulb suctioned. The infant was vigorous on the field. Delayed cord clamping for 30 seconds was observed. Following delayed cord clamping, the cord was clamped and cut x2. The infant was then passed off the table to the awaiting NICU team for  further care. Cord blood was obtained for analysis and routine blood testing and the placenta was expressed.    Oxytocin was administered by IV infusion to enhance uterine contraction. The uterus was cleared of all clots and remaining products of conception, and then repaired in situ. The uterine incision was closed with #0-Vicryl in a running, locked fashion.  Good hemostasis was confirmed.  The RIGHT fallopian tube was then grasped with a Santiago clamp and an opening created in the underlying mesosalpinx using the Bovie.  The entire length of the fallopian tube was isolated from the adjacent adnexa using #0 Plain ties.  Resulting pedicles were cauterized with the bovie.  Hemostasis was achieved without any further measures.  The LEFT fallopian tube was then addressed in a similar fashion.  Both ovaries were noted to be normal.    The pericolic gutters were cleared of all clots.  The peritoneum followed by the rectus muscles were reapproximated with 2-O Vicryl.  The fascia was reapproximated using #0-Vicryl in a running non-locked fashion. The subcutaneous layer was reapproximated with #2-O Vicryl in a running, non-locked fashion. The skin was reapproximated using #4-O Monocryl. The incision was reinforced using Skin glue.     All sponge, needle, and instrument counts were noted to be correct ×3 at the end of the procedure.     Operative Findings:  Normal appearing uterus, ovaries, and fallopian tubes bilaterally.     Specimens:  Placenta, bilateral fallopian tubes, cord blood        Complications:   None      Disposition:   Mother to Mother Baby/Postpartum  in stable condition currently.   Baby to remains with mom  in stable condition currently.       Jasmeet Moya MD  3/17/2025  21:26 CDT        Electronically signed by Jasmeet Moya MD at 03/17/25 2129          Physician Progress Notes (last 48 hours)        Jasmeet Moya MD at 03/17/25 2013              Jasmeet Moya MD  [facility]  [address]  [address]  [place] [address]  Office [phone]  Fax [phone]      [facility]  Wanda Rivers  : 1990  MRN: [id number]  CSN: [id number]    Labor progress note    Subjective   She reports is feeling painful contractions. sHe reports feeling pressure as well. Nursing reports cervical/vaginal swelling/edema with most recent check.       Objective   Min/max vitals past 24 hours:  Temp  Min: 97.6 °F (36.4 °C)  Max: 98.1 °F (36.7 °C)   BP  Min: 94/68  Max: 175/100   Pulse  Min: 72  Max: 112   Resp  Min: 14  Max: 20        FHT's: reactive, reassuring and category 1.  external monitors used   Cervix: was checked (by me): 7 cm / 70 % / -2   Contractions: irregular every 3 minutes - external monitors used       Result Review   Results from last 7 days   Lab Units 25  1936   WBC 10*3/mm3 13.88*   HEMOGLOBIN g/dL 13.4   HEMATOCRIT % 38.8   PLATELETS 10*3/mm3 436     Lab Results   Component Value Date    STREPGPB Negative 2025             Assessment   IUP at 37w1d  Gestational hypertension  Advanced maternal age  GBS negative  Desires permanent sterilization  Failure to progress  O negative  Gestational drug exposure  S/p betamethasone 3/7-3/8    Plan   On pitocin, 20 milliunit/minute. She has made slow and minimal change once active labor at 1500 this afternoon. She has remained unchanged and is now regressing in cervical change to now 7 cm dilated with cervical edema. Options have been discussed with patient. Discussed my concern for failure to progress in active labor. After discussion, she wishes to proceed with  delivery. C/s discussed with patient. Risks/benefits discussed. Bleeding infection, injury to surrounding organs discussed. She does desire permanent sterilization. Consent for tubal has been previously signed on 3/11/2025 (CONSENTS/WAIVERS - SCAN - STERILIZATION CONSENT (2025) ). Tubal reviewed with patient. Risks, benefits, and alternatives of permanent  sterilization were discussed with the patient in detail including feelings of regret.  Irreversible nature of this approach was discussed. Discussed that if she does desire future fertility, that Assisted Reproductive Technologies (ie IVF), would be needed, of which is of significant financial burden. She expressed understanding of the above and agrees to proceed with  delivery with tubal.     Jasmeet Moya MD  3/17/2025  20:13 CDT             Electronically signed by Jasmeet Moya MD at 25

## 2025-03-18 NOTE — PLAN OF CARE
Goal Outcome Evaluation:  Plan of Care Reviewed With: patient        Progress: improving  Outcome Evaluation: VSS. FFMLU1, scant bleeding. DTV, ambulating. incision cdi. taking eras to control pain. parameters 150/100. breastfeeding. bonding well with infant

## 2025-03-18 NOTE — LACTATION NOTE
Mother's Name: Wanda  Phone #: [phone]  Infant Name: Robert   : 3/17/2026 @   Gestation: 37w1d  Day of life: 0  Birth weight:  5-10 (2550 g)  Discharge weight:  Weight Loss: +0.19%  24 hour Summary of Feeds: 3 BF + 4 gtts EBM Voids: DTV Stools: 2  Assistive devices (shields, shells, etc):  Significant Maternal history: , Anxiety, Depression, GERD, HTN, UDS +THC and Tricyclic Antidepressants on admission  Maternal Concerns:   Maternal Goal: unsure, 1 year  Mother's Medications: Tylenol, zyrtec, flexeril, PNV, and sudafed  Breastpump for home: yes, motif aura   Ped follow up appt: TAMMIE Arango    Visited with mother to discuss initial breastfeeding education and +UDS. THC handout provided and discussed recommendation to pump and dump for 30 days and risks of breastfeeding while positive. Mother states that she does not use THC regularly and  has not used for 3 weeks and does not plan to use while breastfeeding, and would still like to breastfeed. Breastfeeding booklet and average feeding amounts handout provided and reviewed. Infant rooting, mother states that she just finished breastfeeding infant on her right breast. Educated mother regarding hunger cues and to feed infant even if it has not been 3 hours. Mother verbalized understanding. With permission assisted with latching infant while in football hold on left breast. Colostrum easily hand expressed and finger fed to infant. Infant latched well and demonstrating deep jaw drops. Praise provided! Mother states that she likes this position and that it is more comfortable. Encouraged plenty of skin to skin as well with both mother and father. Belt phone number on board, encouraged to call with any questions or concerns.     Instructed patient our lactation team is here for continued support throughout their breastfeeding journey. Our team has encouraged patient to call with any questions or concerns that may arise after discharge.      Breastfeeding and Diaper Chart  Check List for Essentials of Positioning And Latch-on handout provided by Lactation Education Resources  Hand Expression handout provided by Lactation Education Resources  Five Keys to Successful Breastfeeding handout provided by Lactation Education Resources    The Many Benefits if Breastfeeding handout given  Breastfeeding saves time  *Breastfeeding allows you to calm or feed your baby immediately, which leads to a happier baby who cries less  *There is nothing to buy, prepare, or maintain.There is nothing to clean or sterilize.  Breastfeeding builds a mothers confidence  *She knows all her baby needs to thrive is her!  Breastfeeding saves Money  *There is no formula to buy and healthier breast fed babies have less medical costs  Healthy Mom/Healthy baby  * babies get sick less often, and when they do they are usually sick less severely and for a shorter time  * babies have fewer ear infections  * babies have fewer allergies  *Mothers who breastfeed have a lower risk for cancer, osteoporosis, anemia, high blood pressure, obesity, and Type ll diabetes  *Mothers miss less work days with sick babies  Breast fed babies have a better dental health  * babies have better jaw development which requires lest orthodontic work  *Breast milk does not promote cavities  * babies can nurse at night without worry of tooth decay  Breastfeeding allows a baby to reach his full IQ potential  *The longer a baby is breast fed, the better their brain development  Breast fed babies and moms are more relaxed  *The hormones released during breastfeeding have a calming effect on mothers  *Breastfeeding requires mom to take a break; this may help mom get more rest after delivery  *Breastfeeding is quicker than preparing formula which allows mom and baby to get back to sleep faster  *Breastfeeding promotes bonding and allows mom to learn babies cues and care  needs more quickly  Breastfeeding cleanup is easier  *The bowel movements and spit up of breast fed babies doesn't smell as bad  *Spit-up of breast fed babies doesn't stain clothing  Getting out of the hourse is easier  *No formula bottles to prepare and carry safely   *No time restraints due to worry about what baby will eat  *No worries about warming a bottle or finding safe water to prepare bottles  Breastfeeding mother get their bodies back sooner  *The uterus shrinks more quickly and completely, which allows a flatter tummy  *Breastfeeding burns 400-500 calories a day; making milk torches stored fat!  Breastfeeding is better for the environment  *There is no trash to dispose of after breastfeeding  *There is no production facility to produce breast milk; moms body does it all without the pollution of a factory      Your Guide to Breastfeeding Booklet by Daric, [email or web address]      Safe Storage of Breastmilk magnet: [employer] system

## 2025-03-18 NOTE — ANESTHESIA POSTPROCEDURE EVALUATION
Patient: Wanda Rivers    Procedure Summary       Date: 25 Room / Location: [facility] LABOR DELIVERY 2 /  [facility] LABOR DELIVERY    Anesthesia Start:  Anesthesia Stop:     Procedure:  SECTION PRIMARY WITH SALPINGECTOMY (Bilateral: Abdomen) Diagnosis:       Gestational hypertension without significant proteinuria in third trimester      (Gestational hypertension without significant proteinuria in third trimester [O13.3])    Surgeons: Jasmeet Moya MD Provider: Hao Ordaz CRNA    Anesthesia Type: epidural ASA Status: 2            Anesthesia Type: epidural    Vitals  Vitals Value Taken Time   /78 25 17:12   Temp 97.7 °F (36.5 °C) 25 17:12   Pulse 83 25 17:12   Resp 16 25 17:12   SpO2 96 % 25 17:12           Post Anesthesia Care and Evaluation    Patient location during evaluation: floor  Patient participation: complete - patient participated  Level of consciousness: awake and alert  Pain management: satisfactory to patient  Anesthetic complications: No anesthetic complications  PONV Status: none  Cardiovascular status: acceptable  Respiratory status: acceptable  Post Neuraxial Block status: Motor and sensory function returned to baseline and No signs or symptoms of PDPH

## 2025-03-18 NOTE — PROGRESS NOTES
TAMMIE Baez  [facility]  [address]  [place] [address]  Office [phone]  Fax [phone]      [facility]   PROGRESS NOTE    Post-Op Day 1 S/P   Subjective     Patient reports:  Pain is well controlled with  ERAS protocol .  She is ambulating. Tolerating diet. Voiding -  Due to void ; no flatus or bowel movement yet reported..  Vaginal bleeding is as much as expected.      Objective      Vitals:  Vital Signs Range for the last 24 hours  Temperature:  Temp:  [97.6 °F (36.4 °C)-100.1 °F (37.8 °C)] 99.3 °F (37.4 °C)  Temp Source:  Temp src: Oral  BP:  BP: ()/() 133/71  Pulse:  Heart Rate:  [] 91  Respirations:  Resp:  [14-20] 18  SPO2:  SpO2:  [90 %-99 %] 94 %  O2 Amount (l/min):     O2 Devices  Device (Oxygen Therapy): room air  Weight:     Physical Exam  Lungs  Respirations even and unlabored  Abdomen  Soft, without significant tenderness; fundus firm  Incision   no drainage, no erythema, no swelling, well approximated, skin glue dressing intact  Extremities  Steady gait, calves nontender; trace lower leg/pedal edema    I reviewed the patient's new clinical results.  Lab Results (last 24 hours)     Procedure  Component  Value  Units  Date/Time  CBC & Differential [[id number]]  (Abnormal)  Collected: 25  Specimen: Blood  Updated: 25 05  Narrative:    The following orders were created for panel order CBC & Differential.  Procedure                               Abnormality         Status                     ---------                               -----------         ------                     CBC Auto Differential[[id number]]        Abnormal            Final result                 Please view results for these tests on the individual orders.  CBC Auto Differential [[id number]]  (Abnormal)  Collected: 25  Specimen: Blood  Updated: 25  0538  WBC  28.98  10*3/mm3  RBC  3.16  10*6/mm3  Hemoglobin  10.3  g/dL  Hematocrit  30.6  %  MCV  96.8  fL  MCH  32.6  pg  MCHC  33.7  g/dL  RDW  12.6  %  RDW-SD  44.0  fl  MPV  10.8  fL  Platelets  328  10*3/mm3  Neutrophil %  86.6  %  Lymphocyte %  6.1  %  Monocyte %  5.5  %  Eosinophil %  0.0  %  Basophil %  0.5  %  Immature Grans %  1.3  %  Neutrophils, Absolute  25.07  10*3/mm3  Lymphocytes, Absolute  1.77  10*3/mm3  Monocytes, Absolute  1.60  10*3/mm3  Eosinophils, Absolute  0.01  10*3/mm3  Basophils, Absolute  0.14  10*3/mm3  Immature Grans, Absolute  0.39  10*3/mm3  nRBC  0.0  /100 WBC  Treponema pallidum AB w/Reflex RPR [[id number]]  (Normal)  Collected: 03/16/25 1936  Specimen: Blood  Updated: 03/17/25 1158  Treponemal AB Total  Non-Reactive  Narrative:    Reactive results will reflex RPR testing.        External Prenatal Results       Pregnancy Outside Results - Transcribed From Office Records - See Scanned Records For Details   TestValueDateTimeABO O 03/18/250516  Rh   Negative   03/18/25  0516  Antibody Screen   Negative   03/18/25  0516      Positive   03/16/25 1936      Negative   01/14/25  1105      Negative   08/23/24  1012  Varicella IgG   2,035 index  08/23/24  1012  Rubella   4.23 index  08/23/24  1012  Hgb   10.3 g/dL  03/18/25  0516      13.4 g/dL  03/16/25 1936      12.7 g/dL  03/08/25  0709      12.9 g/dL  03/07/25  0951      12.3 g/dL  02/28/25  0838      11.1 g/dL  02/07/25  1110      11.5 g/dL  01/14/25  1105      14.3 g/dL  08/23/24  1012  Hct   30.6 %  03/18/25  0516      38.8 %  03/16/25 1936      35.8 %  03/08/25  0709      37.3 %  03/07/25  0951      35.3 %  02/28/25  0838      31.5 %  02/07/25  1110      33.8 %  01/14/25  1105      42.4 %  08/23/24  1012  HgB A1c    5.5 %  08/23/24  1012  1h GTT   128 mg/dL  01/14/25  1105  3h GTT Fasting  3h GTT 1 hour  3h GTT 2 hour  3h GTT 3 hour   Gonorrhea (discrete)   Negative   03/11/25  0907      Negative   08/23/24  1108  Chlamydia  (discrete)   Negative   25  0907      Negative   24  1108  RPR   Non Reactive   25  1105      Non Reactive   24  1012  Syphils cascade: TP-Ab (FTA)   Non-Reactive   25  TP-Ab   Non-Reactive   25  TP-Ab (EIA)  TPPA  HBsAg   Negative   24  1012  Herpes Simplex Virus PCR  Herpes Simplex VIrus Culture  HIV   Non Reactive   24  1012  Hep C RNA Quant PCR  Hep C Antibody  AFP  NIPT  Cystic Fibrosis (Clarisa)   Negative   24  1437  Cystic Fibroisis   Spinal Muscular atrophy   Negative   24  1437  Fragile X  Group B Strep   Negative   25  0907  GBS Susceptibility to Clindamycin  GBS Susceptibility to Erythromycin  Fetal Fibronectin  Genetic Testing, Maternal Blood        Drug Screening   TestValueDateTimeUrine Drug Screen  Amphetamine Screen   Negative   25  Barbiturate Screen   Negative   25  Benzodiazepine Screen   Negative   25  Methadone Screen   Negative   25  Phencyclidine Screen   Negative   25  Opiates Screen   Negative   25  THC Screen   Positive   25  Cocaine Screen  Propoxyphene Screen  Buprenorphine Screen   Negative   25  Methamphetamine Screen  Oxycodone Screen   Negative   25  Tricyclic Antidepressants Screen   Positive   25      Legend   ^: Historical          Assessment & Plan        Gestational hypertension without significant proteinuria in third trimester    Failure to progress in labor, delivered, current hospitalization    Assessment:    Wanda Rivers is Day 1  post-partum  , Low Transverse  .       Plan:  Continue current postpartum and postoperative plan of care. Lactation support. ERAS protocol reviewed, including gastric motility support. May discontinue IV after Toradol complete.        This note has been signed electronically.    Freida Wallis, DNP, APRN, CNM, RNC-OB  3/18/2025  07:29  CDT

## 2025-03-18 NOTE — PROGRESS NOTES
Jasmeet Moya MD  [facility]  [address]  [place] [address]  Office [phone]  Fax [phone]      [facility]  Wanda Rivers  : 1990  MRN: [id number]  CSN: [id number]    Labor progress note    Subjective   She reports is feeling painful contractions. sHe reports feeling pressure as well. Nursing reports cervical/vaginal swelling/edema with most recent check.        Objective   Min/max vitals past 24 hours:  Temp  Min: 97.6 °F (36.4 °C)  Max: 98.1 °F (36.7 °C)   BP  Min: 94/68  Max: 175/100   Pulse  Min: 72  Max: 112   Resp  Min: 14  Max: 20        FHT's: reactive, reassuring and category 1.  external monitors used   Cervix: was checked (by me): 7 cm / 70 % / -2   Contractions: irregular every 3 minutes - external monitors used        Result Review   Results from last 7 days   Lab Units 25  1936   WBC 10*3/mm3 13.88*   HEMOGLOBIN g/dL 13.4   HEMATOCRIT % 38.8   PLATELETS 10*3/mm3 436     Lab Results   Component Value Date    STREPGPB Negative 2025              Assessment   IUP at 37w1d  Gestational hypertension  Advanced maternal age  GBS negative  Desires permanent sterilization  Failure to progress  O negative  Gestational drug exposure  S/p betamethasone 3/7-3/8     Plan   On pitocin, 20 milliunit/minute. She has made slow and minimal change once active labor at 1500 this afternoon. She has remained unchanged and is now regressing in cervical change to now 7 cm dilated with cervical edema. Options have been discussed with patient. Discussed my concern for failure to progress in active labor. After discussion, she wishes to proceed with  delivery. C/s discussed with patient. Risks/benefits discussed. Bleeding infection, injury to surrounding organs discussed. She does desire permanent sterilization. Consent for tubal has been previously signed on 3/11/2025 (CONSENTS/WAIVERS - SCAN - STERILIZATION CONSENT (2025) ). Tubal reviewed with patient.  Risks, benefits, and alternatives of permanent sterilization were discussed with the patient in detail including feelings of regret.  Irreversible nature of this approach was discussed. Discussed that if she does desire future fertility, that Assisted Reproductive Technologies (ie IVF), would be needed, of which is of significant financial burden. She expressed understanding of the above and agrees to proceed with  delivery with tubal.     Jasmeet Moya MD  3/17/2025  20:13 CDT

## 2025-03-18 NOTE — OP NOTE
"[facility]   Section Operative Note    Pre-Operative Dx:   1.  IUP at Gestational Age: 37w1d weeks    2.  Failure to Progress   3.  Labor status: Induced Onset of Labor  Desires permanent sterilization  AMA  GBS negative     Postoperative dx:    1.  Same     Procedure: Procedure(s):   SECTION PRIMARY WITH SALPINGECTOMY   Surgeon/Assistant: Surgeon(s):  Jasmeet Moya MD         Anesthesia:  Anesthesiologist: Choice  CRNA: Meghan Sánchez CRNA; Hao Ordaz CRNA         QBL:  907 mL        IV Fluids: 1000 mls.   UOP: 400 mls. clear    I/O this shift:  In: 1100.2 [I.V.:1000; IV Piggyback:100.2]  Out: 907 [Blood:907]   Antibiotics: clindamycin (Cleocin) and gentamycin (Garamycin)     Infant:           Gender: female infant    Weight: No birth weight on file.    Apgars:   @ 1 minute /       @ 5 minutes    Cord gases: Venous:  No results found for: \"PHCVEN\", \"BECVEN\"     Arterial:  No results found for: \"PHCART\", \"BECART\"     Indication for C/Section:  Failure to Progress        Priority for C/Section:  routine     Procedure Details:   After informed consent was obtained, the patient was taken to the operating room where  Epidural anesthesia was confirmed. A time out procedure was completed, confirming the correct patient and correct procedure. Perioperative antibiotics were administered. She was placed in the supine position with leftward tilt and her abdomen was prepped and draped in normal sterile fashion. Whatley catheter already in place.     After confirming adequate anesthesia, a Pfannenstiel incision was made in the skin with the surgical scalpel. Sharp dissection was carried out over the subsequent layers of tissue down to the fascia at midline. The fascial incision was extended laterally in both directions with fascial stretch. The rectus muscles were divided at midline and the peritoneum was then opened with blunt dissection at its upper margin. The peritoneum was then stretched by " pulling caudally and cranially. All layers of the abdominal wall were manually stretched laterally to provide an opening as large as the skin incision.  An Alex self-retaining retractor was then inserted, taking care to avoid entrapping the bowel.     A bladder flap was created.  A low-transverse incision was made in the lower uterine segment using the scalpel. The uterine incision was extended bilaterally using blunt dissection in a cranial-caudad stretch. The amniotic sac already ruptured. Remaining fluid clear. The surgeon’s hand was placed into the uterine cavity. The fetal head was identified, elevated into the abdomen and delivered through the uterine incision with the assistance of fundal pressure. The infant was examined for nuchal cord. A fairly loose nuchal cord was identified and reduced without excessive tension..    The infant was delivered with traction and the assistance of fundal pressure. The infant’s oral and nasal passages were bulb suctioned. The infant was vigorous on the field. Delayed cord clamping for 30 seconds was observed. Following delayed cord clamping, the cord was clamped and cut x2. The infant was then passed off the table to the awaiting NICU team for further care. Cord blood was obtained for analysis and routine blood testing and the placenta was expressed.    Oxytocin was administered by IV infusion to enhance uterine contraction. The uterus was cleared of all clots and remaining products of conception, and then repaired in situ. The uterine incision was closed with #0-Vicryl in a running, locked fashion.  Good hemostasis was confirmed.  The RIGHT fallopian tube was then grasped with a Houston clamp and an opening created in the underlying mesosalpinx using the Bovie.  The entire length of the fallopian tube was isolated from the adjacent adnexa using #0 Plain ties.  Resulting pedicles were cauterized with the bovie.  Hemostasis was achieved without any further measures.  The  LEFT fallopian tube was then addressed in a similar fashion.  Both ovaries were noted to be normal.    The pericolic gutters were cleared of all clots.  The peritoneum followed by the rectus muscles were reapproximated with 2-O Vicryl.  The fascia was reapproximated using #0-Vicryl in a running non-locked fashion. The subcutaneous layer was reapproximated with #2-O Vicryl in a running, non-locked fashion. The skin was reapproximated using #4-O Monocryl. The incision was reinforced using Skin glue.     All sponge, needle, and instrument counts were noted to be correct ×3 at the end of the procedure.     Operative Findings:  Normal appearing uterus, ovaries, and fallopian tubes bilaterally.     Specimens:  Placenta, bilateral fallopian tubes, cord blood        Complications:   None      Disposition:   Mother to Mother Baby/Postpartum  in stable condition currently.   Baby to remains with mom  in stable condition currently.       Jasmeet Moya MD  3/17/2025  21:26 CDT

## 2025-03-18 NOTE — PLAN OF CARE
Goal Outcome Evaluation:           Progress: improving  Outcome Evaluation: VSS. FFMLU1, scant lochia. Voiding. Passing gas. Ambulating well. Incision c/d/i. Pain well controlled with ERAS. Breastfeeding. EPDS 7. Bonding well with infant.

## 2025-03-19 VITALS
HEART RATE: 88 BPM | BODY MASS INDEX: 35.53 KG/M2 | TEMPERATURE: 98.2 F | HEIGHT: 60 IN | SYSTOLIC BLOOD PRESSURE: 141 MMHG | DIASTOLIC BLOOD PRESSURE: 83 MMHG | RESPIRATION RATE: 16 BRPM | OXYGEN SATURATION: 98 % | WEIGHT: 181 LBS

## 2025-03-19 PROCEDURE — 0503F POSTPARTUM CARE VISIT: CPT | Performed by: OBSTETRICS & GYNECOLOGY

## 2025-03-19 RX ORDER — OXYCODONE AND ACETAMINOPHEN 5; 325 MG/1; MG/1
1 TABLET ORAL EVERY 6 HOURS PRN
Qty: 12 TABLET | Refills: 0 | Status: SHIPPED | OUTPATIENT
Start: 2025-03-19

## 2025-03-19 RX ADMIN — IBUPROFEN 600 MG: 600 TABLET, FILM COATED ORAL at 05:13

## 2025-03-19 RX ADMIN — ACETAMINOPHEN 650 MG: 325 TABLET, FILM COATED ORAL at 01:56

## 2025-03-19 RX ADMIN — PRENATAL VIT W/ FE FUMARATE-FA TAB 27-0.8 MG 1 TABLET: 27-0.8 TAB at 08:46

## 2025-03-19 RX ADMIN — IBUPROFEN 600 MG: 600 TABLET, FILM COATED ORAL at 12:16

## 2025-03-19 RX ADMIN — ACETAMINOPHEN 650 MG: 325 TABLET, FILM COATED ORAL at 08:46
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
## 2025-03-19 NOTE — DISCHARGE SUMMARY
[facility]    Geovanna Cole MD  [address]  [place] [address]  [phone]      Discharge Summary      Wanda Rivers  : 1990  MRN: [id number]  CSN: [id number]    Date of Admission: 3/16/2025   Date of Discharge:  3/19/2025   Delivering Physician: Jasmeet Moya       Admission Diagnosis: Gestational hypertension without significant proteinuria in third trimester [O13.3]  Failure to progress     Discharge Diagnosis: Pregnancy at 37w1d - delivered  same       Procedures: 3/17/2025 - , Low Transverse      Presenting Problem/History of Present Illness  Active Hospital Problems    Diagnosis  POA    **Gestational hypertension without significant proteinuria in third trimester [O13.3]  Yes    Failure to progress in labor, delivered, current hospitalization [O62.2]  Unknown      Resolved Hospital Problems   No resolved problems to display.        Hospital Course  Patient is a 34 y.o.  who at 37w1d had a  section. See the completed operative report for details regarding antepartum course and delivery. Her post-operative course was unremarkable.  On POD # 2 she felt like she ready for discharge.  She was evaluated by Dr. Cole who agreed she was able to be discharged to home.  She had no febrile morbidity. She had normal bowel and bladder function and was hemodynamically stable.  Her wound was healing well without obvious signs of infections.    Infant  female fetus weighing 2550 g (5 lb 10 oz)  Apgars -  8 @ 1 minute /  9 @ 5 minutes.    Procedures Performed    Procedure(s):   SECTION PRIMARY WITH SALPINGECTOMY  -------------------       Consults:   Consults       No orders found from 2/15/2025 to 3/17/2025.            Pertinent Test Results:   CBC          3/8/2025    07:09 3/16/2025    19:36 3/18/2025    05:16   CBC   WBC 9.80  13.88  28.98    RBC 3.88  4.10  3.16    Hemoglobin 12.7  13.4  10.3    Hematocrit 35.8  38.8  30.6     MCV 92.3  94.6  96.8    MCH 32.7  32.7  32.6    MCHC 35.5  34.5  33.7    RDW 12.7  12.7  12.6    Platelets 242  436  328        Condition on Discharge:  Stable    Vital Signs  Temp:  [97.7 °F (36.5 °C)-98.4 °F (36.9 °C)] 97.8 °F (36.6 °C)  Heart Rate:  [83-92] 84  Resp:  [16] 16  BP: (121-139)/(70-91) 139/72    Physical Exam:   No exam performed today,    Discharge Disposition  Home or Self Care    Discharge Medications     Discharge Medications        New Medications        Instructions Start Date   oxyCODONE-acetaminophen 5-325 MG per tablet  Commonly known as: Percocet   1 tablet, Oral, Every 6 Hours PRN             Continue These Medications        Instructions Start Date   acetaminophen 500 MG tablet  Commonly known as: TYLENOL   1,000 mg, Every 6 Hours PRN      Breast Pump misc   1 Device, Not Applicable, As Needed      cetirizine 5 MG tablet  Commonly known as: zyrTEC   5 mg, Daily      cyclobenzaprine 10 MG tablet  Commonly known as: FLEXERIL   10 mg, Oral, Every 8 Hours PRN      EQ Cough DM 30 MG/5ML Suspension Extended Release oral suspension  Generic drug: dextromethorphan polistirex ER   30 mg, Every 12 Hours Scheduled      PRENATAL 1 PO   1 tablet/day, Daily      pseudoephedrine 120 MG 12 hr tablet  Commonly known as: SUDAFED   120 mg, Every 12 Hours               Discharge Diet:  Home diet    Activity at Discharge: , pelvic rest, no lifting greater than baby's weight    Follow-up Appointments  Future Appointments   Date Time Provider Department Center   3/21/2025 10:00 AM [facility] [address] [facility]   3/21/2025 10:45 AM Geovanna Cole MD [facility]   2025  2:00 PM Freida Wallis APRN [facility]   2025  2:00 PM Geovanna Cole MD [facility]       Follow-up for postpartum visit/ incision check in 2 weeks with Dr. Cole.    Test Results Pending at Discharge  Pending Labs       Order Current Status    Cannabinoids, Conf, MS, UR - Urine, Clean  Catch In process    Tissue Pathology Exam In process    Tissue Pathology Exam In process    Tricyclic Class, ToxAssure UR - Urine, Clean Catch In process             Geovanna Cole MD  03/19/25  08:09 CDT    Time: Discharge <30 min

## 2025-03-19 NOTE — LACTATION NOTE
Mother's Name: Wanda  Phone #: [phone]  Infant Name: Robert   : 3/17/2026 @ 2054  Gestation: 37w1d  Day of life: 1  Birth weight:  5-10 (2550 g)  Discharge weight:  Weight Loss: -4.9%  24 hour Summary of Feeds: 9 BF Voids: 3 Stools: 1  Assistive devices (shields, shells, etc):  Significant Maternal history: , Anxiety, Depression, GERD, HTN, UDS +THC and Tricyclic Antidepressants on admission  Maternal Concerns:   Maternal Goal: unsure, 1 year  Mother's Medications: Tylenol, zyrtec, flexeril, PNV, and sudafed (was educated to stop sudafed)  Breastpump for home: yes, motif aura   Ped follow up appt: TAMMIE Arango    Follow up with mother to discuss breastfeeding progress. Mother states that feedings are going well and that she does not have any concerns. Praise provided for mother feeding infant on demand. Mother feeding infant at this time on right breast in football hold, audible swallows noted. Lots of encouragement provided. Breastfeeding after discharge handout provided and reviewed. Offered to measure for appropriate fitting flanges and the benefits of this, mother declined at this time. Encouraged to follow up with outpatient lactation to follow breastfeeding progress, mother agreeable. Appointment made for  at 9 am. Instructed to arrive 15 minutes early to register and to feed infant 2-3 hours prior to appointment. Mother verbalized understanding. Office number provided and encouraged to call with any questions or concerns.Instructed patient our lactation team is here for continued support throughout their breastfeeding journey. Our team has encouraged patient to call with any questions or concerns that may arise after discharge.     Signs of Milk: Fullness, firmness, heaviness of breasts, leaking of milk.  Signs of Good Feed: Breast fullness prior to feed, breasts soft and comfortable after feeding. Infant content after feeding: calm, sleepy, relaxed and without  continued hunger cues.  Signs of Plugged Ducts, Engorgement and Mastitis: Plugged ducts (milk entrapment in milk ducts)- small tender knots that often feel like little beans under breast tissue, usually tender. Massage on these areas of concern while breastfeeding or pumping to promote emptying.   Engorgement- fluid or excess milk, breasts become uncomfortably full, tight, firm (compare to the firmness of your cheek (mild), chin (moderate) or forehead (severe). First line of treatment should be to BREASTFEED, if breasts remain full feeling after a feeding, it may be necessary to pump, ONLY UNTIL BREASTS ARE SOFT AND COMFORTABLE. DO NOT OVER PUMP (complete emptying of breasts can trigger even more milk which will cause continued, recurrent Engorgement).  Mastitis- Infection of the breast tissue, most often caused by plugged ducts that are not adequately treated by emptying, recurrent trauma to nipples or breasts (cracked or bleeding nipples). Signs: redness, swelling, tender knots or fever to breasts as well as generalized fever >101 degrees F that is often sudden onset. Treatment of mastitis, BREASTFEED! Pump after breastfeeding to achieve COMPLETE emptying of affected breast, utilizing massage to areas of concern, may use cold compress to affected area only after breast emptying. May take anti-inflammatories i.e. Ibuprofen, Motrin. CALL your OB for assessment and continued treatment with Antibiotics to adequately treat mastitis.  Infant Care: Over the first 2 weeks it is important to keep record of infant's feeding routine (feeding times and durations), wet and dirty diaper frequency, stool color and any spit ups that may occur.  Keep in mind, ALL babies will lose some weight initially (usually no more than 10% by day 3). Until infant returns to/ surpasses birth weight (which can take up to 2 weeks), it is important to offer feedings AT LEAST EVERY 3 HOURS. Remember, if you choose to supplement infant with formula  or previously pumped milk, you should always pump in replacement of that feeding in order to promote and maintain a healthy milk supply!  Maternal Care: REST, sleep when the infant sleeps, stay hydrated (water is optimal) drink to thirst, increase caloric intake - breastfeeding mother's need an ADDITIONAL 500 calories per day , eat 3 meals/day as well as snacks in between, limit CAFFIENE intake to 2 cups/day. Ask your significant other, family members or friends for help when needed, taking advantage of meal trains, allowing others to help with laundry, house chores, etc can help you focus on what is most important early on after delivery… you and your infant, and breastfeeding!   Medications to CONTINUE: Prenatal Vitamins are important to continue taking while breastfeeding. Fish oil, magnesium/calcium supplements often are helpful to support Mothers and their milk supply as well. Tylenol, Ibuprofen, regular Zyrtec, Claritin are SAFE if you suffer from seasonal allergies. Flonase is safe and often an effective medication to take if suffering from sinus drainage/pressure.  Medications to AVOID: Benadryl, Sudafed, any medications including “DM” or have a drying effect to sinus drainage will also dry a mother's milk up. Birth control- your OB will want to address birth control options with you usually around 4-6 weeks postpartum, be sure to notify your MD if you continue to breastfeed as some birth controls may significantly decrease your milk supply. Herbals- some herbs may also decrease your milk supply: PEPPERMINT, MENTHOL in any form (candies, essential oils, teas, etc), so check labels and avoid using in excess.  Pumping: Although we encourage you to focus on breastfeeding over the first 2-4 weeks, you will need to plan to begin pumping. We do recommend implementing pumping by the time infant is 4 weeks old. Pump 2-3 times per day immediately AFTER breastfeeding, it is normal to collect very small amounts  initially, but the more consistently you pump, the more you will begin to collect. Store collected milk in refrigerator or freezer. You should also begin offering infant a bottle around 4 weeks. Remember to use slow flow nipples and PACE the bottle-feed. A bottle feed should take about as long as a breastfeeding session.

## 2025-03-19 NOTE — DISCHARGE INSTR - APPOINTMENTS
Your provider has ordered you and your infant an Outpatient Lactation Follow up appointment on March 20th at 9 am  at our [facility] location at [address], [address] with MARYAM Marie. Please arrive 15 minutes early to get registered for your Outpatient Lactation Clinic Appointment. You can reach [facility] Lactation Department at [phone].          Appointment with Freida Wallis on April 2nd at 2:00 pm    Appointment with  on April 30th at 2:00 pm

## 2025-03-19 NOTE — PROGRESS NOTES
Geovanna Cole MD  [facility]  [address]  [place] [address]  Office [phone]  Fax [phone]      [facility]   PROGRESS NOTE    Post-Op Day 2 S/P   Subjective     Patient reports:  Pain is well controlled with  ERAS .  She is ambulating. Tolerating diet. Voiding - without difficulty; flatus reported..  Vaginal bleeding is as much as expected.      Objective      Vitals: Vital Signs Range for the last 24 hours  Temperature: Temp:  [97.7 °F (36.5 °C)-98.4 °F (36.9 °C)] 97.8 °F (36.6 °C)   Temp Source: Temp src: Temporal   BP: BP: (121-139)/(70-91) 139/72   Pulse: Heart Rate:  [83-92] 84   Respirations: Resp:  [16] 16   SPO2: SpO2:  [95 %-99 %] 99 %   O2 Amount (l/min):     O2 Devices Device (Oxygen Therapy): room air   Weight:              Physical Exam    Lungs Non-labored, symmetrical chest rise   Abdomen Soft, no TTP, no distension   Incision  no drainage, no erythema   Extremities extremities normal, atraumatic, no cyanosis or edema     I reviewed the patient's new clinical results.  Lab Results (last 24 hours)       Procedure Component Value Units Date/Time    Tissue Pathology Exam [[id number]] Collected: 25    Specimen: Tissue from Placenta; Tissue from Fallopian Tube, Left; Tissue from Fallopian Tube, Right Updated: 25 0907    Tissue Pathology Exam [[id number]] Collected: 25    Specimen: Tissue from Fallopian Tube, Left; Tissue from Fallopian Tube, Right; Tissue from Placenta Updated: 25 0900            Prenatal Labs  Lab Results   Component Value Date    HGB 10.3 (L) 2025    RUBELLAABIGG 4.23 2024    HEPBSAG Negative 2024    ABSCRN Negative 2025    OXF5EHW3 Non Reactive 2024     2025    STREPGPB Negative 2025    URINECX No growth 2025    CHLAMNAA Negative 2025    NGONORRHON Negative 2025       Immunizations:   Immunization History   Administered Date(s)  Administered    HPV Quadrivalent 2014    Rho (D) Immune Globulin 2023, 2024, 2025, 2025    Tdap 2025     Lab Results (last 24 hours)       Procedure Component Value Units Date/Time    Tissue Pathology Exam [[id number]] Collected: 25    Specimen: Tissue from Placenta; Tissue from Fallopian Tube, Left; Tissue from Fallopian Tube, Right Updated: 25    Tissue Pathology Exam [[id number]] Collected: 25    Specimen: Tissue from Fallopian Tube, Left; Tissue from Fallopian Tube, Right; Tissue from Placenta Updated: 25            CBC          3/8/2025    07:09 3/16/2025    19:36 3/18/2025    05:16   CBC   WBC 9.80  13.88  28.98    RBC 3.88  4.10  3.16    Hemoglobin 12.7  13.4  10.3    Hematocrit 35.8  38.8  30.6    MCV 92.3  94.6  96.8    MCH 32.7  32.7  32.6    MCHC 35.5  34.5  33.7    RDW 12.7  12.7  12.6    Platelets 242  436  328          Assessment & Plan        Gestational hypertension without significant proteinuria in third trimester    Failure to progress in labor, delivered, current hospitalization        Assessment:    Wanda Rivers is Day 2  post-partum  , Low Transverse  .       Plan:  plan for discharge today.        Geovanna Cole MD  3/19/2025  08:08 CDT

## 2025-03-20 ENCOUNTER — MATERNAL SCREENING (OUTPATIENT)
Dept: CALL CENTER | Facility: HOSPITAL | Age: 35
End: 2025-03-20
Payer: COMMERCIAL

## 2025-03-20 ENCOUNTER — HOSPITAL ENCOUNTER (OUTPATIENT)
Dept: LACTATION | Facility: HOSPITAL | Age: 35
Discharge: HOME | End: 2025-03-20
Payer: COMMERCIAL

## 2025-03-20 NOTE — LACTATION NOTE
"Mother's Name:  Wanda Rivers  G/P:    3/1  Significant Medical History: THC+ and Tricyclic Antidepressants on admission,  (Educated on this as inpt).      HTN, GERD  Maternal Breast Assessment:  bilateral pendulous flaccid breast, no nipple damage, milk easily expressed    Infant's Name:  Robert Goncalves  (\"Uh MAR ee on uh\")  Date of Birth:   [birth year]  Gestational age at Birth: 37-1  Age:    3 days  Physician:   Aiden/Maci Ortiz [place]                     Reason for Visit:  Weight check, transfer at breast eval          Infant's Birth weight:  5-10.0 2550g   Previous Weight:  5-5.5 2425g  Wt Loss:  4.9%    Today's Weight:     5-3.6 2370g  Wt Loss:  7.1%    Feeding History Since Discharge/Last Lactation Appt.: Mom bfing every 3 hours; bilateral breasts. Attempted pumping with manual pump and it was very painful. Pt using Motif with size 15 mm inserts.     Past 24 Hours Voids/Stools:  5-6 / 5      Color of Stool: brownish    Time at Breast         Right Breast:     15  mins +10 mls             Left Breast:     15 mins +6 mls                     Final Wt:  5-4.1  Total Minutes:      30       Total Weight Gain:      16    gms/mls    or 1/2 oz,  EXCELLENT!!  Avg feed amounts for age:  15-30 mls or 1/2 - 1 oz every feeding, 8-12 times in 24 hours, every 2-3 hours.     Interventions:Minor assistance given for latching infant to breast. Mother very proficient as using modified football hold; koala hold with infant facing breast semi upright. Mother pinching nipple and attempting to insert into infant's partially closed lips. Educated and demo'ed prompting wide gape, then quickly folding large amount of areola into infant's mouth. With verbal cues, mother held breast 1-2\" back from nipple to allow better latching. Infant latched well and remained on breast. Mild stim required to keep Deepak swallowing, though large slow jaw drops noted through entire feed. Infant able to remain alert enough to drink well without " "ever having to be removed from breast to awaken. Demo'ed vigorous rib tickles, crown massage. Infant responded to this immediately. Pt used swallowing triggers at chin and neck without my prompting. Pt voiced \"I tried to learn everything they told me in the hospital.\" Placed [place] at breast with minimal droplets collected. Encouraged mom to use as nipple care.   Flange:  measures at size 14mm, but as 15mm is comfortable, encouraged pt to continue using. Pt was unaware that flange insert should have been placed in manual pump with each use also.  Demo'ed using pump more gently with breast model, pulling handle down ONLY so far as it remained comfortable.   Ceasing THC Use: Upon attempt to educate again on potential harmful effects of using THC while bfing, pt politely interrupted me and said \"NO! I don't smoke anymore!\" Pt voiced used as anti-nausea intervention, but was unaware of harmful effect for infant. Reports has not used in 3 weeks and does not plan to do so. Much praise given for this, reminding pt of potential neurological and motor dev delays it could cause.     Education: Discussed with handouts given on :  Average feed amounts, waking prompts, Paced bottle feeding, latching    Feeding Plan:   Keep doing what you are doing. Great job!    Plan of Care:  Interventions accomplished satisfactorily, requires no further action.    Future Appointments:  Lactation: March 27, 10 am, [facility] location    Physician: TAMMIE Arango  4/2/25    Signature: Carole Kapoor KELVIN    Date:  3/20/25     "

## 2025-03-20 NOTE — OUTREACH NOTE
Maternal Screening Survey      Flowsheet Row Responses   Eligibility Eligible   Prep survey completed? Yes   Facility patient discharged from? [facility] - Registered Nurse

## 2025-03-21 LAB
AMITRIP UR QL CFM: NOT DETECTED
ANTIDEPRESSANTS UR QL: NEGATIVE
CANNABINOIDS UR QL CFM: NORMAL
CARBOXYTHC/CREAT UR: 48 NG/MG CREAT
CLOMIPRAMINE UR QL: NOT DETECTED
DESIPRAMINE UR QL CFM: NOT DETECTED
DOXEPIN UR QL CFM: NOT DETECTED
IMIPRAMINE UR QL CFM: NOT DETECTED
LEVEL OF DETECTION:: NORMAL
LEVEL OF DETECTION:: NORMAL
NORCLOMIPRAMINE UR QL: NOT DETECTED
NORDOXEPIN UR QL: NOT DETECTED
NORTRIP UR QL CFM: NOT DETECTED
PROTRIP UR QL: NOT DETECTED
TRIMIPRAMINE UR QL: NOT DETECTED

## 2025-03-27 ENCOUNTER — HOSPITAL ENCOUNTER (OUTPATIENT)
Dept: LACTATION | Facility: HOSPITAL | Age: 35
Discharge: HOME | End: 2025-03-27

## 2025-03-27 NOTE — LACTATION NOTE
"Mother's Name:                       Wanda Rivers  G/P:                                         3/1  Significant Medical History:    THC+ and Tricyclic Antidepressants on admission,  (Educated on this x 2/ hospital and OP).                                                  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;  for factory, pumping room available  Support Person:  Kenny Goncalves, FOB, supportive     Infant's Name:                       Robert Goncalves  (\"Uh MAR ee on uh\")  Date of Birth:                           [birth year]  Gestational age at Birth:         37-1  Age:                                         10 days  Physician:                                Aiden/Maci Ortiz [place]                       Reason for Visit:                     Weight check, transfer at breast eval                     Infant's Birth weight:                5-10.0  2550g    Previous Weight:                     5-5.5  2425g              Wt Loss:  4.9%  Last Weight   5-3.6  2370g  Wt Loss:  7.1%    Today's Weight:                  5-7.6  2484g              Wt Loss:  2.6%     Feeding History Since Discharge/Last Lactation Appt.: Mom bfing every 3 hours; bilateral breasts. Mom attempted to only partially squeeze manual pump, but says got very little milk with it. Pt also using size 15mm flanges with Motif with pain. Pt plans to obtain size 14mm  inserts for Motif today. Dad has given two bottles with paced horizontal feeding. Mom collected drops only when Dad fed. Mom pumped this am and collected 2 oz AFTER bfing. Mom is trying to build stockpile. She pumps for 30-40 mins after BFing and collects drops-2oz per session. Motif is malfunctioning with one side drawing suction much better than the other side. Suggested she contact [employer] or Motif company to ensure pumps works properly. Mom uses Haakka while bfing.      Past " 24 Hours Voids/Stools:  5-6 / 5      Color of Stool: brownish     Time at Breast    (Last feed [facility] from both breasts briefly; [facility]5 [facility]  Right Breast:  15 mins +22 mls                                  Left Breast:  12 mins +10 mls                              Final Wt:    Total Minutes:      27       Total Weight Gain:    32 mls or 1 oz   (Though this is only half of expected intake, overall picture is good.)    Avg feed amounts for age:  60-90  mls or 2-3 oz every feeding, 8-12 times in 24 hours, every 2-3 hours.      Interventions: Very little!  Mother expertly latched infant in football hold with ease. Infant began gulping immediately. Mom proficient in using swallowing triggers and waking prompts to keep infant feeding. Much praise given for this. Dad very supportive, assisting with positioning, and waking.      Feeding Plan:   Keep doing what you are doing. Great job!  Keep her awake and drinking entire time at breast. Squeeze breast as she feeds.  Excellent improvement on latching and keeping Amariana awake and drinking.  Use Haakkaa while bfing.   Get pump fixed; either with help from [employer] or calling .   Keep pump on low setting even with 14mm flanges- do not let it hurt while pumping.      Plan of Care:  Interventions accomplished satisfactorily, requires no further action.     Future Appointments:  Lactation:        as desired by mother, call for [facility] [phone]   Physician:        Geovanna Jordan, TAMMIE  4[birth year]   Signature:        Carole Kapoor [facility]   Date:                3/27/25

## 2025-03-28 ENCOUNTER — MATERNAL SCREENING (OUTPATIENT)
Dept: CALL CENTER | Facility: HOSPITAL | Age: 35
End: 2025-03-28
Payer: COMMERCIAL

## 2025-03-28 NOTE — OUTREACH NOTE
Maternal Screening Survey      Flowsheet Row Responses   Facility patient discharged from? [facility]   Attempt successful? Yes   Call start time 1239   Call end time 1241   I have been able to laugh and see the funny side of things. 0   I have looked forward with enjoyment to things. 0   I have blamed myself unnecessarily when things went wrong. 0   I have been anxious or worried for no good reason. 0   I have felt scared or panicky for no good reason. 0   Things have been getting on top of me. 0   I have been so unhappy that I have had difficulty sleeping. 0   I have felt sad or miserable. 0   I have been so unhappy that I have been crying. 0   The thought of harming myself has occurred to me. 0   Ethel  Depression Scale Total 0   Did any of your parents have problems with alcohol or drug use? No   Do any of your peers have problems with alcohol or drug use? No   Does your partner have problems with alcohol or drug use? No   Before you were pregnant did you have problems with alcohol or drug use? (past) No   In the past month, did you drink beer, wine, liquor or use any other drugs? (pregnancy) No   Maternal Screening call completed Yes   Call end time 1241              Adelaida FLOREZ - Registered Nurse

## 2025-03-29 LAB
CYTO UR: NORMAL
CYTO UR: NORMAL
LAB AP CASE REPORT: NORMAL
LAB AP CASE REPORT: NORMAL
Lab: NORMAL
Lab: NORMAL
PATH REPORT.FINAL DX SPEC: NORMAL
PATH REPORT.FINAL DX SPEC: NORMAL
PATH REPORT.GROSS SPEC: NORMAL
PATH REPORT.GROSS SPEC: NORMAL

## 2025-04-08 ENCOUNTER — POSTPARTUM VISIT (OUTPATIENT)
Age: 35
End: 2025-04-08
Payer: COMMERCIAL

## 2025-04-08 VITALS
WEIGHT: 166.3 LBS | HEIGHT: 60 IN | BODY MASS INDEX: 32.65 KG/M2 | DIASTOLIC BLOOD PRESSURE: 78 MMHG | SYSTOLIC BLOOD PRESSURE: 118 MMHG

## 2025-04-08 DIAGNOSIS — Z13.32 ENCOUNTER FOR SCREENING FOR MATERNAL DEPRESSION: ICD-10-CM

## 2025-04-08 DIAGNOSIS — Z48.89 ENCOUNTER FOR EXAMINATION OF SURGICAL SITE: ICD-10-CM

## 2025-04-08 NOTE — PROGRESS NOTES
Subjective   Chief Complaint   Patient presents with    Postpartum Care     Pt here for 3 week postpartum visit,  3/17/25, Girl, PPD score 0 , Pt reports some muscle soreness in abdomen, denies any other problems.      Wanda Rivers is a 34 y.o. year old  presenting to be seen for a postpartum visit for an incision, blood pressure, and postpartum mood assessment.  She had a Primary  (LTCS).   Prenatal course was gestational hypertension poorly controlled and advanced maternal age.      History of Present Illness  The patient is a 34-year-old female who presents for a postpartum visit and  wound check.    She reports no headaches or visual disturbances. She experiences pain on the left side of her rib cage during coughing episodes. Her bleeding has been minimal, with only occasional spotting. She does not report any urinary issues or constipation, although she did experience these symptoms during the first week postpartum. She expresses concern about the glue used for her incision, fearing it may be a stitch. She has been using the postop abdominal brace for support. She continues to take Percocet, prenatal vitamins, and has ibuprofen at home.     She is currently breastfeeding and has noticed that her infant feeds continuously, which has resulted in decreased milk production during pumping sessions. She has attempted to alternate between bottle and breast feeding, but her infant consumes approximately 3 ounces from the bottle before becoming full. She has had two lactation appointments where it was determined that her infant was consuming an ounce from each breast. She has not introduced formula into her infant's diet and is exclusively breastfeeding. She has expressed interest in learning about power pumping.      The following portions of the patient's history were reviewed and updated as appropriate: problem list, current medications, and allergies    Social History  "    Socioeconomic History    Marital status: Significant Other     Spouse name: Haylee   Tobacco Use    Smoking status: Every Day     Current packs/day: 0.50     Average packs/day: 0.5 packs/day for 22.3 years (11.2 ttl pk-yrs)     Types: Cigarettes     Start date: 2003     Passive exposure: Never    Smokeless tobacco: Never   Vaping Use    Vaping status: Former   Substance and Sexual Activity    Alcohol use: Not Currently    Drug use: Yes     Types: Marijuana     Comment: Used THC about 3 weeks    Sexual activity: Yes     Partners: Male     Birth control/protection: None     Review of Systems   Constitutional:  Positive for fatigue.   Eyes:  Negative for visual disturbance.   Respiratory:  Negative for shortness of breath.    Cardiovascular:  Negative for chest pain and leg swelling.   Gastrointestinal:  Negative for abdominal pain, constipation, diarrhea, nausea and vomiting.   Endocrine: Negative for cold intolerance and heat intolerance.   Genitourinary:  Positive for vaginal bleeding. Negative for difficulty urinating, dysuria, pelvic pain and vaginal discharge.   Musculoskeletal:  Negative for back pain.   Skin:  Positive for wound (incision healing). Negative for rash.   Neurological:  Negative for dizziness and headaches.   Hematological:  Negative for adenopathy.   Psychiatric/Behavioral:  Negative for dysphoric mood, self-injury and suicidal ideas. The patient is nervous/anxious.          Objective   /78 (BP Location: Right arm, Patient Position: Sitting, Cuff Size: Adult)   Ht 152.4 cm (60\")   Wt 75.4 kg (166 lb 4.8 oz)   LMP 06/24/2024   Breastfeeding Yes   BMI 32.48 kg/m²     General:  well developed; well nourished  no acute distress  mentation appropriate   Abdomen: soft, non-tender; no masses  Incision is healing without signs of infection or seroma. Skin glue is peeling.   Extremities: Steady gait. No edema. Calves nontender.   Pelvis: Not performed.       Cervical cancer screening: " none on record - to be completed at future visit       Diagnoses and all orders for this visit:    1. Postpartum state (Primary)    2. Encounter for examination of surgical site    3. Encounter for screening for maternal depression    4. Lactating mother        Assessment & Plan  1. Postpartum visit.  Her blood pressure readings are within normal range today. The surgical incision is healing well, with no areas of concern identified. She is advised to continue her regimen of prenatal vitamins, ensure adequate hydration, and increase her caloric intake by an additional 500 calories per day over her regular diet. She is also encouraged to incorporate oatmeal into her diet to potentially enhance milk production. She can continue taking ibuprofen and Tylenol as needed for pain management. She is advised to resume regular activities as tolerated and use the brace as needed. Continue with pelvic rest until at least 6 weeks postpartum.     2.  wound check.  The surgical incision is healing well with no areas of concern. The glue is coming off, and she is advised to remove it in the shower. The stitches are dissolving and do not require removal. She is advised to monitor for any signs of infection or complications and report any concerns promptly.    3. Lactation concerns.  She is experiencing difficulty with pumping and feels that the baby is using her as a pacifier. She is advised to schedule a lactation appointment to assess the baby's intake and receive guidance on power pumping. She can also try topping off the baby with a bottle if the baby is crying and seems unsatisfied after breastfeeding.    Follow-up  The patient will follow up in 4 weeks with Dr. Cole.      Refer to [facility] instructions for additional education provided.         This note was electronically signed.    Freida Wallis, NOE, APRN, CNM  2025    Patient or patient representative verbalized consent for the use of Ambient Listening during  the visit with  TAMMIE Baez for chart documentation. 4/8/2025  14:21 CDT

## 2025-04-30 ENCOUNTER — POSTPARTUM VISIT (OUTPATIENT)
Age: 35
End: 2025-04-30
Payer: COMMERCIAL

## 2025-04-30 VITALS
DIASTOLIC BLOOD PRESSURE: 84 MMHG | HEIGHT: 60 IN | BODY MASS INDEX: 32.59 KG/M2 | SYSTOLIC BLOOD PRESSURE: 112 MMHG | WEIGHT: 166 LBS

## 2025-04-30 RX ORDER — IBUPROFEN 200 MG
200 TABLET ORAL EVERY 6 HOURS PRN
COMMUNITY

## 2025-04-30 NOTE — LETTER
April 30, 2025     Patient: Wanda Rivers   YOB: 1990   Date of Visit: 4/30/2025       To Whom It May Concern:    It is my medical opinion that Wanda Rivers may return to full duty immediately with no restrictions on 5/12/2025.           Sincerely,        Geovanna Cole MD    CC: No Recipients

## 2025-05-19 NOTE — PROGRESS NOTES
"Chief Complaint  Postpartum Care (Pt here for 6 wk PP visit, primary c/s with tubal 3, girl, currently breast and formula feeding, PPD screening 0, last pap >3 years ago. Declines pap today and will schedule annual exam with PCP. )    Subjective        Wanda Rivers presents to [facility] for postpartum visit after primary c/s with bilateral salpingectomy. Pt doing well. Both breast and formula feeding. Would like to have pap smear with PCP and declines pap/exam today. Denies pp depression. Reports normal bowel and bladder function. Denies pain.   History of Present Illness    Objective   Vital Signs:  /84 (BP Location: Right arm, Patient Position: Sitting)   Ht 152.4 cm (60\")   Wt 75.3 kg (166 lb)   BMI 32.42 kg/m²   Estimated body mass index is 32.42 kg/m² as calculated from the following:    Height as of this encounter: 152.4 cm (60\").    Weight as of this encounter: 75.3 kg (166 lb).          Physical Exam   Abdomen: soft/NT/ND normal BS, incision well healed  Result Review :                Assessment and Plan   Diagnoses and all orders for this visit:    1. Postpartum care following  delivery (Primary)    33 y/o CF here for pp visit, doing well. Resume all normal activities. Bilateral salpingectomy at time of c/s.  Baby doing well and pt denies pp depression. To have pap smear with PCP. RTC as needed.          Follow Up   No follow-ups on file.  Patient was given instructions and counseling regarding her condition or for health maintenance advice. Please see specific information pulled into the AVS if appropriate.             "

## (undated) DEVICE — LARGE, DISPOSABLE C-SECTION RETRACTOR: Brand: ALEXIS ® O C-SECTION PROTECTOR/RETRACTOR

## (undated) DEVICE — GLOVE,SURG,SENSICARE SLT,LF,PF,7.5: Brand: MEDLINE

## (undated) DEVICE — TRAP TISS EA/10

## (undated) DEVICE — PAD D&C: Brand: MEDLINE INDUSTRIES, INC.

## (undated) DEVICE — ANTIBACTERIAL VIOLET BRAIDED (POLYGLACTIN 910), SYNTHETIC ABSORBABLE SUTURE: Brand: COATED VICRYL

## (undated) DEVICE — PAD C-SECTION: Brand: MEDLINE INDUSTRIES, INC.

## (undated) DEVICE — SUT MNCRYL 4/0 PS2 27IN UD MCP426H

## (undated) DEVICE — STERILE RAYON TIPPED OB/GYN APPLICATORS: Brand: PURITAN

## (undated) DEVICE — U.V.A.C. SWIVEL HANDLE W/TUBING, 6': Brand: CONVERTORS

## (undated) DEVICE — VACURETTE CRV RIGD 8MM DISP

## (undated) DEVICE — SUT VIC 0 CT1 36IN J946H

## (undated) DEVICE — Device

## (undated) DEVICE — KENDALL SCD EXPRESS SLEEVES, KNEE LENGTH, LARGE: Brand: KENDALL SCD

## (undated) DEVICE — APPL CHLORAPREP HI/LITE 26ML ORNG

## (undated) DEVICE — CANSTR SXN UTER NO FLTR SURG

## (undated) DEVICE — TBG W FLTR FOR BERKELEY SYSTEM

## (undated) DEVICE — PENCL SMOKE/EVAC MEGADYNE TELESCP 10FT

## (undated) DEVICE — ADHS SKIN PREMIERPRO EXOFIN TOPICAL HI/VISC .5ML

## (undated) DEVICE — CVR HNDL LIGHT RIGID

## (undated) DEVICE — PATIENT RETURN ELECTRODE, SINGLE-USE, CONTACT QUALITY MONITORING, ADULT, WITH 15 FT (4.5 M) CORD. FOR PATIENTS WEIGHING OVER 33LBS. (15KG): Brand: MEGADYNE

## (undated) DEVICE — KT DRP MINIVIEW STRL